# Patient Record
Sex: MALE | Race: WHITE | NOT HISPANIC OR LATINO | Employment: FULL TIME | URBAN - NONMETROPOLITAN AREA
[De-identification: names, ages, dates, MRNs, and addresses within clinical notes are randomized per-mention and may not be internally consistent; named-entity substitution may affect disease eponyms.]

---

## 2017-07-07 ENCOUNTER — TRANSCRIBE ORDERS (OUTPATIENT)
Dept: GENERAL RADIOLOGY | Facility: HOSPITAL | Age: 37
End: 2017-07-07

## 2017-07-07 ENCOUNTER — LAB (OUTPATIENT)
Dept: LAB | Facility: HOSPITAL | Age: 37
End: 2017-07-07

## 2017-07-07 DIAGNOSIS — Z00.00 ROUTINE GENERAL MEDICAL EXAMINATION AT A HEALTH CARE FACILITY: ICD-10-CM

## 2017-07-07 DIAGNOSIS — Z00.00 ROUTINE GENERAL MEDICAL EXAMINATION AT A HEALTH CARE FACILITY: Primary | ICD-10-CM

## 2017-07-07 LAB
ALBUMIN SERPL-MCNC: 4.3 G/DL (ref 3.5–5)
ALBUMIN/GLOB SERPL: 1.2 G/DL (ref 1.1–2.5)
ALP SERPL-CCNC: 77 U/L (ref 24–120)
ALT SERPL W P-5'-P-CCNC: 31 U/L (ref 0–54)
ANION GAP SERPL CALCULATED.3IONS-SCNC: 10 MMOL/L (ref 4–13)
AST SERPL-CCNC: 29 U/L (ref 7–45)
AUTO MIXED CELLS #: 0.3 10*3/MM3 (ref 0.1–2.6)
AUTO MIXED CELLS %: 3.6 % (ref 0.1–24)
BILIRUB SERPL-MCNC: 1.3 MG/DL (ref 0.1–1)
BILIRUB UR QL STRIP: NEGATIVE
BUN BLD-MCNC: 16 MG/DL (ref 5–21)
BUN/CREAT SERPL: 16.3
CALCIUM SPEC-SCNC: 9.6 MG/DL (ref 8.4–10.4)
CHLORIDE SERPL-SCNC: 100 MMOL/L (ref 98–110)
CHOLEST SERPL-MCNC: 171 MG/DL (ref 130–200)
CLARITY UR: CLEAR
CO2 SERPL-SCNC: 30 MMOL/L (ref 24–31)
COLOR UR: YELLOW
CREAT BLD-MCNC: 0.98 MG/DL (ref 0.5–1.4)
ERYTHROCYTE [DISTWIDTH] IN BLOOD BY AUTOMATED COUNT: 15.4 % (ref 12–15)
GFR SERPL CREATININE-BSD FRML MDRD: 86 ML/MIN/1.73
GLOBULIN UR ELPH-MCNC: 3.6 GM/DL
GLUCOSE BLD-MCNC: 90 MG/DL (ref 70–100)
GLUCOSE UR STRIP-MCNC: NEGATIVE MG/DL
HBA1C MFR BLD: 4.7 %
HCT VFR BLD AUTO: 48.4 % (ref 40–52)
HDLC SERPL-MCNC: 36 MG/DL
HGB BLD-MCNC: 18.3 G/DL (ref 14–18)
HGB UR QL STRIP.AUTO: NEGATIVE
KETONES UR QL STRIP: ABNORMAL
LDLC SERPL CALC-MCNC: 98 MG/DL (ref 0–99)
LDLC/HDLC SERPL: 2.73 {RATIO}
LEUKOCYTE ESTERASE UR QL STRIP.AUTO: NEGATIVE
LYMPHOCYTES # BLD AUTO: 2.8 10*3/MM3 (ref 0.8–7)
LYMPHOCYTES NFR BLD AUTO: 29.1 % (ref 15–45)
MCH RBC QN AUTO: 32.3 PG (ref 28–32)
MCHC RBC AUTO-ENTMCNC: 37.8 G/DL (ref 33–36)
MCV RBC AUTO: 85.4 FL (ref 82–95)
NEUTROPHILS # BLD AUTO: 6.6 10*3/MM3 (ref 1.5–8.3)
NEUTROPHILS NFR BLD AUTO: 67.3 % (ref 39–78)
NITRITE UR QL STRIP: NEGATIVE
PH UR STRIP.AUTO: 6 [PH] (ref 5–8)
PLATELET # BLD AUTO: 197 10*3/MM3 (ref 130–400)
PMV BLD AUTO: 9.8 FL (ref 6–12)
POTASSIUM BLD-SCNC: 4.9 MMOL/L (ref 3.5–5.3)
PROT SERPL-MCNC: 7.9 G/DL (ref 6.3–8.7)
PROT UR QL STRIP: NEGATIVE
RBC # BLD AUTO: 5.67 10*6/MM3 (ref 4.2–5.4)
SODIUM BLD-SCNC: 140 MMOL/L (ref 135–145)
SP GR UR STRIP: 1.02 (ref 1–1.03)
TRIGL SERPL-MCNC: 183 MG/DL (ref 0–149)
TSH SERPL DL<=0.05 MIU/L-ACNC: 1.95 MIU/ML (ref 0.47–4.68)
UROBILINOGEN UR QL STRIP: ABNORMAL
VLDLC SERPL-MCNC: 36.6 MG/DL
WBC NRBC COR # BLD: 9.7 10*3/MM3 (ref 4.8–10.8)

## 2017-07-07 PROCEDURE — 83036 HEMOGLOBIN GLYCOSYLATED A1C: CPT

## 2017-07-07 PROCEDURE — 80061 LIPID PANEL: CPT | Performed by: NURSE PRACTITIONER

## 2017-07-07 PROCEDURE — 80053 COMPREHEN METABOLIC PANEL: CPT | Performed by: NURSE PRACTITIONER

## 2017-07-07 PROCEDURE — 84443 ASSAY THYROID STIM HORMONE: CPT | Performed by: NURSE PRACTITIONER

## 2017-07-07 PROCEDURE — 36415 COLL VENOUS BLD VENIPUNCTURE: CPT

## 2017-07-07 PROCEDURE — 81003 URINALYSIS AUTO W/O SCOPE: CPT

## 2017-07-07 PROCEDURE — 85025 COMPLETE CBC W/AUTO DIFF WBC: CPT | Performed by: NURSE PRACTITIONER

## 2017-08-02 ENCOUNTER — OFFICE VISIT (OUTPATIENT)
Dept: RETAIL CLINIC | Facility: CLINIC | Age: 37
End: 2017-08-02

## 2017-08-02 VITALS
HEART RATE: 88 BPM | SYSTOLIC BLOOD PRESSURE: 117 MMHG | OXYGEN SATURATION: 98 % | RESPIRATION RATE: 18 BRPM | TEMPERATURE: 98.3 F | DIASTOLIC BLOOD PRESSURE: 86 MMHG | WEIGHT: 234 LBS

## 2017-08-02 DIAGNOSIS — R09.82 POST-NASAL DRAINAGE: ICD-10-CM

## 2017-08-02 DIAGNOSIS — J02.9 SORE THROAT: Primary | ICD-10-CM

## 2017-08-02 LAB
EXPIRATION DATE: NORMAL
INTERNAL CONTROL: NORMAL
Lab: NORMAL
S PYO AG THROAT QL: NEGATIVE

## 2017-08-02 PROCEDURE — 87880 STREP A ASSAY W/OPTIC: CPT | Performed by: NURSE PRACTITIONER

## 2017-08-02 PROCEDURE — 99213 OFFICE O/P EST LOW 20 MIN: CPT | Performed by: NURSE PRACTITIONER

## 2017-08-02 RX ORDER — AMOXICILLIN 500 MG/1
500 CAPSULE ORAL 2 TIMES DAILY
Qty: 20 CAPSULE | Refills: 0 | Status: SHIPPED | OUTPATIENT
Start: 2017-08-02 | End: 2017-08-12

## 2017-08-02 NOTE — PROGRESS NOTES
Subjective   Danny Ponce is a 37 y.o. male.     History of Present Illness   Danny Ponce presents today with complaints of sore throat, earache, and headache.  Symptoms first began Friday, and he felt fatigued and had chills.  Saturday he had a fever, but the fever has resolved.  The sore throat and headache have persisted; sore throat worse with swallowing.  He reports feeling like there is something in his throat that he can't clear, which he thinks is due to drainage.  His daughter recently had a cold.  No exposure to strep that he knows of.  He has been taking a multi symptom cold medicine with some relief of sore throat symptoms.    The following portions of the patient's history were reviewed and updated as appropriate: allergies, current medications, past medical history, past surgical history and problem list.    Review of Systems   Constitutional: Positive for appetite change, chills (resolved), fatigue and fever (resolved).   HENT: Positive for ear pain, postnasal drip and sore throat. Negative for congestion and rhinorrhea. Trouble swallowing: painful.    Respiratory: Negative.    Gastrointestinal: Positive for nausea. Negative for abdominal pain and vomiting.   Neurological: Positive for headaches.       Objective   Physical Exam   Constitutional: He is oriented to person, place, and time. He appears well-developed and well-nourished.   HENT:   Right Ear: External ear and ear canal normal. Tympanic membrane is erythematous (small area of redness along right edge).   Left Ear: Tympanic membrane, external ear and ear canal normal.   Nose: Nose normal.   Mouth/Throat: Uvula is midline and mucous membranes are normal. Posterior oropharyngeal erythema present. Tonsils are 2+ on the right. Tonsils are 2+ on the left. No tonsillar exudate.   Thick mucoid drainage in oropharynx   Cardiovascular: Regular rhythm and normal heart sounds.    Pulmonary/Chest: Effort normal and breath sounds normal.    Lymphadenopathy:        Head (right side): No submandibular and no tonsillar adenopathy present.        Head (left side): No submandibular and no tonsillar adenopathy present.     He has no cervical adenopathy.   Neurological: He is alert and oriented to person, place, and time.   Skin: Skin is warm and dry.   Vitals reviewed.      Assessment/Plan   Danny was seen today for sore throat.    Diagnoses and all orders for this visit:    Sore throat  -     POC Rapid Strep A    Post-nasal drainage    Other orders  -     amoxicillin (AMOXIL) 500 MG capsule; Take 1 capsule by mouth 2 (Two) Times a Day for 10 days.        Rapid strep negative.  Sore throat symptoms likely due to PND.  Patient advised to try an antihistamine.  He has tried using a neti pot, but he states it wasn't effective.  If symptoms have not improved in 48 hours or start worsening, start antibiotic.

## 2018-02-07 PROCEDURE — 85025 COMPLETE CBC W/AUTO DIFF WBC: CPT | Performed by: FAMILY MEDICINE

## 2018-02-07 PROCEDURE — 85007 BL SMEAR W/DIFF WBC COUNT: CPT | Performed by: FAMILY MEDICINE

## 2018-02-16 ENCOUNTER — APPOINTMENT (OUTPATIENT)
Dept: LAB | Facility: HOSPITAL | Age: 38
End: 2018-02-16

## 2018-02-16 ENCOUNTER — OFFICE VISIT (OUTPATIENT)
Dept: GASTROENTEROLOGY | Facility: CLINIC | Age: 38
End: 2018-02-16

## 2018-02-16 VITALS
DIASTOLIC BLOOD PRESSURE: 80 MMHG | SYSTOLIC BLOOD PRESSURE: 120 MMHG | OXYGEN SATURATION: 99 % | TEMPERATURE: 95.3 F | BODY MASS INDEX: 33.88 KG/M2 | HEIGHT: 71 IN | WEIGHT: 242 LBS | HEART RATE: 58 BPM

## 2018-02-16 DIAGNOSIS — R10.32 LEFT LOWER QUADRANT PAIN: Primary | ICD-10-CM

## 2018-02-16 DIAGNOSIS — R19.4 CHANGE IN BOWEL HABITS: ICD-10-CM

## 2018-02-16 LAB
ANION GAP SERPL CALCULATED.3IONS-SCNC: 12 MMOL/L (ref 4–13)
BASOPHILS # BLD AUTO: 0.04 10*3/MM3 (ref 0–0.2)
BASOPHILS NFR BLD AUTO: 0.6 % (ref 0–2)
BUN BLD-MCNC: 14 MG/DL (ref 5–21)
BUN/CREAT SERPL: 15.6 (ref 7–25)
CALCIUM SPEC-SCNC: 9.7 MG/DL (ref 8.4–10.4)
CHLORIDE SERPL-SCNC: 103 MMOL/L (ref 98–110)
CO2 SERPL-SCNC: 29 MMOL/L (ref 24–31)
CREAT BLD-MCNC: 0.9 MG/DL (ref 0.5–1.4)
DEPRECATED RDW RBC AUTO: 38.1 FL (ref 40–54)
EOSINOPHIL # BLD AUTO: 0.07 10*3/MM3 (ref 0–0.7)
EOSINOPHIL NFR BLD AUTO: 1 % (ref 0–4)
ERYTHROCYTE [DISTWIDTH] IN BLOOD BY AUTOMATED COUNT: 12.7 % (ref 12–15)
GFR SERPL CREATININE-BSD FRML MDRD: 95 ML/MIN/1.73
GLUCOSE BLD-MCNC: 85 MG/DL (ref 70–100)
HCT VFR BLD AUTO: 47.8 % (ref 40–52)
HGB BLD-MCNC: 17.6 G/DL (ref 14–18)
IMM GRANULOCYTES # BLD: 0.01 10*3/MM3 (ref 0–0.03)
IMM GRANULOCYTES NFR BLD: 0.1 % (ref 0–5)
LYMPHOCYTES # BLD AUTO: 2.47 10*3/MM3 (ref 0.72–4.86)
LYMPHOCYTES NFR BLD AUTO: 36.1 % (ref 15–45)
MCH RBC QN AUTO: 30.7 PG (ref 28–32)
MCHC RBC AUTO-ENTMCNC: 36.8 G/DL (ref 33–36)
MCV RBC AUTO: 83.3 FL (ref 82–95)
MONOCYTES # BLD AUTO: 0.46 10*3/MM3 (ref 0.19–1.3)
MONOCYTES NFR BLD AUTO: 6.7 % (ref 4–12)
NEUTROPHILS # BLD AUTO: 3.79 10*3/MM3 (ref 1.87–8.4)
NEUTROPHILS NFR BLD AUTO: 55.5 % (ref 39–78)
NRBC BLD MANUAL-RTO: 0 /100 WBC (ref 0–0)
PLATELET # BLD AUTO: 218 10*3/MM3 (ref 130–400)
PMV BLD AUTO: 10.6 FL (ref 6–12)
POTASSIUM BLD-SCNC: 4.9 MMOL/L (ref 3.5–5.3)
RBC # BLD AUTO: 5.74 10*6/MM3 (ref 4.8–5.9)
SODIUM BLD-SCNC: 144 MMOL/L (ref 135–145)
WBC NRBC COR # BLD: 6.84 10*3/MM3 (ref 4.8–10.8)

## 2018-02-16 PROCEDURE — 80048 BASIC METABOLIC PNL TOTAL CA: CPT | Performed by: NURSE PRACTITIONER

## 2018-02-16 PROCEDURE — 99214 OFFICE O/P EST MOD 30 MIN: CPT | Performed by: NURSE PRACTITIONER

## 2018-02-16 PROCEDURE — 36415 COLL VENOUS BLD VENIPUNCTURE: CPT | Performed by: NURSE PRACTITIONER

## 2018-02-16 PROCEDURE — 85025 COMPLETE CBC W/AUTO DIFF WBC: CPT | Performed by: NURSE PRACTITIONER

## 2018-02-16 NOTE — PROGRESS NOTES
"[facility] GASTROENTEROLOGY - OFFICE NOTE    2/16/2018    Danny Ponce   1980    Primary Physician: Enzo Lim MD    Chief Complaint   Patient presents with   • Abdominal Pain     LOWER LEFT         HISTORY OF PRESENT ILLNESS    Danny Ponce is a 37 y.o. male presents  with intermittent llq pain, described as a cramp. Started summer 2017, initially saw dr lim and was thought to be constipation, was recommended laxative, took laxative and symptoms resolved.  Recurrence of pain 3-4  Months ago.  Also has noted change in stool caliber , flat stools and occasional loose stools. No rectal bleeding. Has had some nausea , no vomiting. No fever or chills. No weight loss.  No back pain.  No problems urinating.  No triggers. No alleviating factors.  No imaging studies.  He saw dr kaur recently and says \" did not find a hernia\".  No h/o colonoscopy or egd. No family h/o colon cancer or polyps.  No family h/o ibd.           History reviewed. No pertinent past medical history.    Past Surgical History:   Procedure Laterality Date   • NO PAST SURGERIES         No outpatient prescriptions have been marked as taking for the 2/16/18 encounter (Office Visit) with JOSSY Modi.       No Known Allergies    Social History     Social History   • Marital status:      Spouse name: N/A   • Number of children: N/A   • Years of education: N/A     Occupational History   • Not on file.     Social History Main Topics   • Smoking status: Never Smoker   • Smokeless tobacco: Never Used   • Alcohol use Yes      Comment: occ   • Drug use: No   • Sexual activity: Defer     Other Topics Concern   • Not on file     Social History Narrative       Family History   Problem Relation Age of Onset   • Diabetes Mother    • Stomach cancer Maternal Grandfather    • Colon cancer Neg Hx    • Colon polyps Neg Hx        Review of Systems   Constitutional: Negative for appetite change, chills, fatigue, fever and unexpected weight " "change.   HENT: Negative for sore throat and trouble swallowing.    Respiratory: Negative for cough, chest tightness, shortness of breath and wheezing.    Cardiovascular: Negative for chest pain and palpitations.   Gastrointestinal: Positive for abdominal pain, diarrhea and nausea. Negative for abdominal distention, anal bleeding, blood in stool, constipation, rectal pain and vomiting.        As mentioned in hpi   Genitourinary: Negative for difficulty urinating, dysuria, frequency and hematuria.   Musculoskeletal: Negative for arthralgias, back pain and myalgias.   Skin: Negative for color change and rash.   Neurological: Positive for headaches. Negative for dizziness, seizures, syncope and light-headedness.   Hematological: Negative for adenopathy.   Psychiatric/Behavioral: Negative for confusion. The patient is not nervous/anxious.         Vitals:    02/16/18 1001   BP: 120/80   BP Location: Left arm   Patient Position: Sitting   Cuff Size: Adult   Pulse: 58   Temp: 95.3 °F (35.2 °C)   SpO2: 99%   Weight: 110 kg (242 lb)   Height: 180.3 cm (71\")      Body mass index is 33.75 kg/(m^2).    Physical Exam   Constitutional: He appears well-developed and well-nourished. No distress.   HENT:   Head: Normocephalic and atraumatic.   Eyes: EOM are normal. No scleral icterus.   Neck: Neck supple. No JVD present.   Cardiovascular: Normal rate, regular rhythm and normal heart sounds.    Pulmonary/Chest: Effort normal and breath sounds normal. No stridor.   Abdominal: Soft. Bowel sounds are normal. He exhibits no distension and no mass. There is no tenderness. There is no rebound and no guarding.   Musculoskeletal: He exhibits no edema.   Neurological: He is alert.   Skin: Skin is warm and dry. No rash noted.   Psychiatric: He has a normal mood and affect. His behavior is normal.   Vitals reviewed.      Results for orders placed or performed during the hospital encounter of 02/07/18   CBC Auto Differential   Result Value Ref " Range    WBC 6.99 4.80 - 10.80 10*3/mm3    RBC 5.76 4.80 - 5.90 10*6/mm3    Hemoglobin 17.6 14.0 - 18.0 g/dL    Hematocrit 47.8 40.0 - 52.0 %    MCV 83.0 82.0 - 95.0 fL    MCH 30.6 28.0 - 32.0 pg    MCHC 36.8 (H) 33.0 - 36.0 g/dL    RDW 12.9 12.0 - 15.0 %    RDW-SD 38.5 (L) 40.0 - 54.0 fl    MPV 10.2 6.0 - 12.0 fL    Platelets 199 130 - 400 10*3/mm3    Neutrophil % 50.9 39.0 - 78.0 %    Lymphocyte % 40.6 15.0 - 45.0 %    Monocyte % 6.0 4.0 - 12.0 %    Eosinophil % 2.0 0.0 - 4.0 %    Basophil % 0.4 0.0 - 2.0 %    Immature Grans % 0.1 0.0 - 5.0 %    Neutrophils, Absolute 3.55 1.87 - 8.40 10*3/mm3    Lymphocytes, Absolute 2.84 0.72 - 4.86 10*3/mm3    Monocytes, Absolute 0.42 0.19 - 1.30 10*3/mm3    Eosinophils, Absolute 0.14 0.00 - 0.70 10*3/mm3    Basophils, Absolute 0.03 0.00 - 0.20 10*3/mm3    Immature Grans, Absolute 0.01 0.00 - 0.03 10*3/mm3    nRBC 0.0 0.0 - 0.0 /100 WBC   POCT Urinalysis dipstick, automated   Result Value Ref Range    Color Yellow Yellow, Straw, Dark Yellow, Crystal    Clarity, UA Clear Clear    Glucose, UA Negative Negative, 1000 mg/dL (3+) mg/dL    Bilirubin Negative Negative    Ketones, UA Negative Negative    Specific Gravity  1.025 1.005 - 1.030    Blood, UA Negative Negative    pH, Urine 5.5 5.0 - 8.0    Protein, POC Negative Negative mg/dL    Urobilinogen, UA Normal Normal    Leukocytes Negative Negative    Nitrite, UA Negative Negative           ASSESSMENT AND PLAN    Danny was seen today for abdominal pain.    Diagnoses and all orders for this visit:    Left lower quadrant pain  -     CT Abdomen Pelvis With Contrast  -     CBC & Differential  -     Basic Metabolic Panel    Change in bowel habits      Plan for ct abd/pelvis and if negative then proceed with colonoscopy.  Er if worsening symptoms.         Body mass index is 33.75 kg/(m^2).       JOSSY Cabrera Dragon/transcription disclaimer:  Much of this encounter note is electronic transcription/translation of spoken  language to printed text.  The electronic translation of spoken language may be erroneous, or at times, nonsensical words or phrases may be inadvertently transcribed.  Although I have reviewed the note for such errors, some may still exist.    I called the patient today, he still continues to have llq pain but has improved some, not resolved.  He was actually tender on exam llq at his office visit that was not included in my exam.  He was  tender on exam of left lower quadrant  when seen in the urgent care on 2/7/18 as well.  Ct has been ordered and I will speak with his insurance company tomorrow for a peer to peer.

## 2018-02-17 ENCOUNTER — TELEPHONE (OUTPATIENT)
Dept: GASTROENTEROLOGY | Facility: CLINIC | Age: 38
End: 2018-02-17

## 2018-03-02 ENCOUNTER — APPOINTMENT (OUTPATIENT)
Dept: CT IMAGING | Facility: HOSPITAL | Age: 38
End: 2018-03-02

## 2018-03-09 ENCOUNTER — TELEPHONE (OUTPATIENT)
Dept: GASTROENTEROLOGY | Facility: CLINIC | Age: 38
End: 2018-03-09

## 2018-03-09 ENCOUNTER — PREP FOR SURGERY (OUTPATIENT)
Dept: OTHER | Facility: HOSPITAL | Age: 38
End: 2018-03-09

## 2018-03-09 ENCOUNTER — HOSPITAL ENCOUNTER (OUTPATIENT)
Dept: CT IMAGING | Facility: HOSPITAL | Age: 38
Discharge: HOME | End: 2018-03-09
Admitting: NURSE PRACTITIONER

## 2018-03-09 DIAGNOSIS — R10.32 LEFT LOWER QUADRANT PAIN: Primary | ICD-10-CM

## 2018-03-09 LAB — CREAT BLDA-MCNC: 1 MG/DL (ref 0.6–1.3)

## 2018-03-09 PROCEDURE — 0 IOHEXOL 300 MG/ML SOLUTION: Performed by: NURSE PRACTITIONER

## 2018-03-09 PROCEDURE — 0 IOPAMIDOL 61 % SOLUTION: Performed by: NURSE PRACTITIONER

## 2018-03-09 PROCEDURE — 82565 ASSAY OF CREATININE: CPT

## 2018-03-09 PROCEDURE — 74177 CT ABD & PELVIS W/CONTRAST: CPT

## 2018-03-09 RX ORDER — POLYETHYLENE GLYCOL 3350, SODIUM CHLORIDE, SODIUM BICARBONATE, POTASSIUM CHLORIDE 420; 11.2; 5.72; 1.48 G/4L; G/4L; G/4L; G/4L
4000 POWDER, FOR SOLUTION ORAL SEE ADMIN INSTRUCTIONS
Qty: 4000 ML | Refills: 0 | Status: SHIPPED | OUTPATIENT
Start: 2018-03-09 | End: 2018-03-23 | Stop reason: HOSPADM

## 2018-03-09 RX ADMIN — IOPAMIDOL 100 ML: 612 INJECTION, SOLUTION INTRAVENOUS at 09:45

## 2018-03-09 RX ADMIN — IOHEXOL 50 ML: 300 INJECTION, SOLUTION INTRAVENOUS at 09:45

## 2018-03-09 NOTE — TELEPHONE ENCOUNTER
Please let him know I tried to call him with no answer, let him know that the CT scan did not show diverticulitis, it did show a small area on his liver that most likely is a cyst which would not require any further workup.  Let him know I did call the radiologist and he said an ultrasound might give us more information.  So if he is agreeable let me know and I will schedule ultrasound of the liver as well.  I will put the case request in for the colonoscopy as well since the colonoscopy did not show diverticulitis.

## 2018-03-12 DIAGNOSIS — R93.5 ABNORMAL CT OF THE ABDOMEN: Primary | ICD-10-CM

## 2018-03-12 NOTE — TELEPHONE ENCOUNTER
I put order in for ultrasound liver, also make sure susan aware I put order in for colonoscopy. Thanks

## 2018-03-13 NOTE — TELEPHONE ENCOUNTER
PT is scheduled 4-13-18 for colonoscopy.  PT requested further out due to wife pregnant and due anytime.  US sent to scheduling.

## 2018-03-14 PROBLEM — R10.32 LEFT LOWER QUADRANT PAIN: Status: ACTIVE | Noted: 2018-03-14

## 2018-03-15 ENCOUNTER — HOSPITAL ENCOUNTER (OUTPATIENT)
Dept: ULTRASOUND IMAGING | Facility: HOSPITAL | Age: 38
Discharge: HOME | End: 2018-03-15
Admitting: NURSE PRACTITIONER

## 2018-03-15 DIAGNOSIS — R93.5 ABNORMAL CT OF THE ABDOMEN: ICD-10-CM

## 2018-03-15 PROCEDURE — 76705 ECHO EXAM OF ABDOMEN: CPT

## 2018-03-18 ENCOUNTER — TELEPHONE (OUTPATIENT)
Dept: GASTROENTEROLOGY | Facility: CLINIC | Age: 38
End: 2018-03-18

## 2018-03-18 NOTE — TELEPHONE ENCOUNTER
Let patient know ultrasound of the liver shows a simple cyst which would require no further workup

## 2018-03-23 ENCOUNTER — HOSPITAL ENCOUNTER (OUTPATIENT)
Facility: HOSPITAL | Age: 38
Setting detail: HOSPITAL OUTPATIENT SURGERY
Discharge: HOME | End: 2018-03-23
Attending: INTERNAL MEDICINE | Admitting: INTERNAL MEDICINE

## 2018-03-23 ENCOUNTER — ANESTHESIA EVENT (OUTPATIENT)
Dept: GASTROENTEROLOGY | Facility: HOSPITAL | Age: 38
End: 2018-03-23

## 2018-03-23 ENCOUNTER — ANESTHESIA (OUTPATIENT)
Dept: GASTROENTEROLOGY | Facility: HOSPITAL | Age: 38
End: 2018-03-23

## 2018-03-23 VITALS
BODY MASS INDEX: 32.48 KG/M2 | RESPIRATION RATE: 14 BRPM | TEMPERATURE: 97.8 F | HEART RATE: 92 BPM | OXYGEN SATURATION: 96 % | DIASTOLIC BLOOD PRESSURE: 78 MMHG | SYSTOLIC BLOOD PRESSURE: 119 MMHG | WEIGHT: 232 LBS | HEIGHT: 71 IN

## 2018-03-23 PROCEDURE — 45378 DIAGNOSTIC COLONOSCOPY: CPT | Performed by: INTERNAL MEDICINE

## 2018-03-23 PROCEDURE — 25010000002 PROPOFOL 10 MG/ML EMULSION: Performed by: NURSE ANESTHETIST, CERTIFIED REGISTERED

## 2018-03-23 RX ORDER — ONDANSETRON 2 MG/ML
4 INJECTION INTRAMUSCULAR; INTRAVENOUS ONCE AS NEEDED
Status: DISCONTINUED | OUTPATIENT
Start: 2018-03-23 | End: 2018-03-23 | Stop reason: HOSPADM

## 2018-03-23 RX ORDER — SODIUM CHLORIDE 0.9 % (FLUSH) 0.9 %
3 SYRINGE (ML) INJECTION AS NEEDED
Status: DISCONTINUED | OUTPATIENT
Start: 2018-03-23 | End: 2018-03-23 | Stop reason: HOSPADM

## 2018-03-23 RX ORDER — LIDOCAINE HYDROCHLORIDE 20 MG/ML
INJECTION, SOLUTION INFILTRATION; PERINEURAL AS NEEDED
Status: DISCONTINUED | OUTPATIENT
Start: 2018-03-23 | End: 2018-03-23 | Stop reason: SURG

## 2018-03-23 RX ORDER — SODIUM CHLORIDE 9 MG/ML
500 INJECTION, SOLUTION INTRAVENOUS CONTINUOUS PRN
Status: DISCONTINUED | OUTPATIENT
Start: 2018-03-23 | End: 2018-03-23 | Stop reason: HOSPADM

## 2018-03-23 RX ORDER — PROPOFOL 10 MG/ML
VIAL (ML) INTRAVENOUS AS NEEDED
Status: DISCONTINUED | OUTPATIENT
Start: 2018-03-23 | End: 2018-03-23 | Stop reason: SURG

## 2018-03-23 RX ADMIN — PROPOFOL 50 MG: 10 INJECTION, EMULSION INTRAVENOUS at 13:23

## 2018-03-23 RX ADMIN — LIDOCAINE HYDROCHLORIDE 0.5 ML: 10 INJECTION, SOLUTION EPIDURAL; INFILTRATION; INTRACAUDAL; PERINEURAL at 10:42

## 2018-03-23 RX ADMIN — LIDOCAINE HYDROCHLORIDE 50 MG: 20 INJECTION, SOLUTION INFILTRATION; PERINEURAL at 13:17

## 2018-03-23 RX ADMIN — PROPOFOL 50 MG: 10 INJECTION, EMULSION INTRAVENOUS at 13:20

## 2018-03-23 RX ADMIN — PROPOFOL 50 MG: 10 INJECTION, EMULSION INTRAVENOUS at 13:22

## 2018-03-23 RX ADMIN — PROPOFOL 20 MG: 10 INJECTION, EMULSION INTRAVENOUS at 13:24

## 2018-03-23 RX ADMIN — PROPOFOL 20 MG: 10 INJECTION, EMULSION INTRAVENOUS at 13:25

## 2018-03-23 RX ADMIN — PROPOFOL 50 MG: 10 INJECTION, EMULSION INTRAVENOUS at 13:17

## 2018-03-23 RX ADMIN — PROPOFOL 20 MG: 10 INJECTION, EMULSION INTRAVENOUS at 13:26

## 2018-03-23 RX ADMIN — SODIUM CHLORIDE 500 ML: 9 INJECTION, SOLUTION INTRAVENOUS at 10:41

## 2018-03-23 NOTE — H&P
"[facility] Gastroenterology  Pre Procedure History & Physical    Chief Complaint:   Change in bowel habits    Subjective     HPI:   He was having some left sided discomfort is left lower quadrant.  There is associated with some constipation and pencil thin stools.  He states he is improved.  Abdominal discomfort is improved.  He is no longer having the discomfort.  He still has some loose stools.    Past Medical History:   History reviewed. No pertinent past medical history.    Past Surgical History:  Past Surgical History:   Procedure Laterality Date   • NO PAST SURGERIES          Family History:  Family History   Problem Relation Age of Onset   • Diabetes Mother    • Stomach cancer Maternal Grandfather    • Colon cancer Neg Hx    • Colon polyps Neg Hx        Social History:   reports that he has never smoked. He has never used smokeless tobacco. He reports that he drinks alcohol. He reports that he does not use drugs.    Medications:   Prior to Admission medications    Medication Sig Start Date End Date Taking? Authorizing Provider   polyethylene glycol-electrolytes (NULYTELY WITH FLAVOR PACKS) 420 g solution Take 4,000 mL by mouth See Admin Instructions. 3/9/18  Yes JOSSY Modi       Allergies:  Review of patient's allergies indicates no known allergies.    ROS:    General: Weight stable  Resp: No SOA  Cardiovascular: No CP    Objective     Pulse 100, temperature 97.8 °F (36.6 °C), temperature source Temporal Artery , resp. rate 20, height 180.3 cm (71\"), weight 105 kg (232 lb), SpO2 100 %.    Physical Exam   Constitutional: Pt is oriented to person, place, and in no distress.   HENT: Mouth/Throat: Oropharynx is clear.   Cardiovascular: Normal rate, regular rhythm.    Pulmonary/Chest: Effort normal. No respiratory distress. No  wheezes.   Abdominal: Soft. Non-distended.  Skin: Skin is warm and dry.   Psychiatric: Mood, memory, affect and judgment appear normal.     Assessment/Plan "     Diagnosis:  Change in bowel habits with left lower quadrant discomfort    Anticipated Surgical Procedure:  Colonoscopy    The risks, benefits, and alternatives of this procedure have been discussed with the patient or the responsible party- the patient understands and agrees to proceed.    EMR Dragon/transcription disclaimer:  Much of this encounter note is electronic transcription/translation of spoken language to printed text.  The electronic translation of spoken language may be erroneous, or at times, nonsensical words or phrases may be inadvertently transcribed.  Although I have reviewed the note for such errors, some may still exist.

## 2018-03-23 NOTE — ANESTHESIA PREPROCEDURE EVALUATION
Anesthesia Evaluation     Patient summary reviewed   no history of anesthetic complications:  NPO Solid Status: > 8 hours  NPO Liquid Status: > 4 hours           Airway   Mallampati: I  TM distance: >3 FB  Neck ROM: full  No difficulty expected  Dental - normal exam     Pulmonary    (-) asthma, sleep apnea, not a smoker  Cardiovascular - negative cardio ROS  Exercise tolerance: good (4-7 METS)    (-) hypertension, past MI, CAD      Neuro/Psych  (-) seizures, TIA, CVA  GI/Hepatic/Renal/Endo - negative ROS   (-) liver disease, no renal disease, diabetes    Musculoskeletal (-) negative ROS    Abdominal    Substance History      OB/GYN          Other                        Anesthesia Plan    ASA 1     general   total IV anesthesia  intravenous induction   Anesthetic plan and risks discussed with patient.

## 2018-06-19 ENCOUNTER — CLINICAL SUPPORT (OUTPATIENT)
Dept: RETAIL CLINIC | Facility: CLINIC | Age: 38
End: 2018-06-19

## 2018-06-19 DIAGNOSIS — Z23 NEED FOR VACCINATION: Primary | ICD-10-CM

## 2018-06-19 NOTE — PROGRESS NOTES
Patient presents to clinic requesting Tetanus shot.  He was outside and stepped on a landscaping staple and has a puncture wound to his toe. His is currently wearing shoes and socks and ambulating without difficulty.  He does not want to be seen today for the wound as he feels he can manage it himself. (I encouraged him to keep the wound clean and dry and to monitor for s/s of infection).  I have explained that we do not have plain Tetanus toxoid available. I have offered him Tdap and he has reviewed the VIS.  He would like to precede with the Tdap vaccination.  See administration note.

## 2018-08-23 ENCOUNTER — TRANSCRIBE ORDERS (OUTPATIENT)
Dept: ADMINISTRATIVE | Facility: HOSPITAL | Age: 38
End: 2018-08-23

## 2018-08-23 DIAGNOSIS — Z13.6 ENCOUNTER FOR SCREENING FOR VASCULAR DISEASE: Primary | ICD-10-CM

## 2018-08-28 ENCOUNTER — HOSPITAL ENCOUNTER (OUTPATIENT)
Dept: ULTRASOUND IMAGING | Facility: HOSPITAL | Age: 38
Discharge: HOME | End: 2018-08-28
Attending: PEDIATRICS | Admitting: PEDIATRICS

## 2018-08-28 DIAGNOSIS — Z13.6 ENCOUNTER FOR SCREENING FOR VASCULAR DISEASE: ICD-10-CM

## 2018-08-28 PROCEDURE — 93799 UNLISTED CV SVC/PROCEDURE: CPT

## 2018-10-02 ENCOUNTER — TRANSCRIBE ORDERS (OUTPATIENT)
Dept: ADMINISTRATIVE | Facility: HOSPITAL | Age: 38
End: 2018-10-02

## 2018-10-02 ENCOUNTER — LAB (OUTPATIENT)
Dept: LAB | Facility: HOSPITAL | Age: 38
End: 2018-10-02

## 2018-10-02 ENCOUNTER — HOSPITAL ENCOUNTER (OUTPATIENT)
Dept: ULTRASOUND IMAGING | Facility: HOSPITAL | Age: 38
Discharge: HOME | End: 2018-10-02
Admitting: NURSE PRACTITIONER

## 2018-10-02 DIAGNOSIS — R09.89 OTHER SPECIFIED SYMPTOMS AND SIGNS INVOLVING THE CIRCULATORY AND RESPIRATORY SYSTEMS: ICD-10-CM

## 2018-10-02 DIAGNOSIS — R09.89 OTHER SPECIFIED SYMPTOMS AND SIGNS INVOLVING THE CIRCULATORY AND RESPIRATORY SYSTEMS: Primary | ICD-10-CM

## 2018-10-02 LAB
ALBUMIN SERPL-MCNC: 4.2 G/DL (ref 3.5–5)
ALBUMIN/GLOB SERPL: 1.4 G/DL (ref 1.1–2.5)
ALP SERPL-CCNC: 77 U/L (ref 24–120)
ALT SERPL W P-5'-P-CCNC: 50 U/L (ref 0–54)
ANION GAP SERPL CALCULATED.3IONS-SCNC: 9 MMOL/L (ref 4–13)
AST SERPL-CCNC: 30 U/L (ref 7–45)
AUTO MIXED CELLS #: 0.5 10*3/MM3 (ref 0.1–2.6)
AUTO MIXED CELLS %: 5.5 % (ref 0.1–24)
BILIRUB SERPL-MCNC: 0.8 MG/DL (ref 0.1–1)
BUN BLD-MCNC: 15 MG/DL (ref 5–21)
BUN/CREAT SERPL: 18.3
CALCIUM SPEC-SCNC: 8.7 MG/DL (ref 8.4–10.4)
CHLORIDE SERPL-SCNC: 106 MMOL/L (ref 98–110)
CO2 SERPL-SCNC: 27 MMOL/L (ref 24–31)
CREAT BLD-MCNC: 0.82 MG/DL (ref 0.5–1.4)
ERYTHROCYTE [DISTWIDTH] IN BLOOD BY AUTOMATED COUNT: 15.5 % (ref 12–15)
GFR SERPL CREATININE-BSD FRML MDRD: 105 ML/MIN/1.73
GLOBULIN UR ELPH-MCNC: 3 GM/DL
GLUCOSE BLD-MCNC: 96 MG/DL (ref 70–100)
HCT VFR BLD AUTO: 45.7 % (ref 40–52)
HGB BLD-MCNC: 16.7 G/DL (ref 14–18)
LYMPHOCYTES # BLD AUTO: 2.7 10*3/MM3 (ref 0.8–7)
LYMPHOCYTES NFR BLD AUTO: 31.1 % (ref 15–45)
MCH RBC QN AUTO: 31.3 PG (ref 28–32)
MCHC RBC AUTO-ENTMCNC: 36.5 G/DL (ref 33–36)
MCV RBC AUTO: 85.7 FL (ref 82–95)
NEUTROPHILS # BLD AUTO: 5.4 10*3/MM3 (ref 1.5–8.3)
NEUTROPHILS NFR BLD AUTO: 63.4 % (ref 39–78)
PLATELET # BLD AUTO: 205 10*3/MM3 (ref 130–400)
PMV BLD AUTO: 9.5 FL (ref 6–12)
POTASSIUM BLD-SCNC: 4.4 MMOL/L (ref 3.5–5.3)
PROT SERPL-MCNC: 7.2 G/DL (ref 6.3–8.7)
RBC # BLD AUTO: 5.33 10*6/MM3 (ref 4.2–5.4)
SODIUM BLD-SCNC: 142 MMOL/L (ref 135–145)
WBC NRBC COR # BLD: 8.6 10*3/MM3 (ref 4.8–10.8)

## 2018-10-02 PROCEDURE — 76536 US EXAM OF HEAD AND NECK: CPT

## 2018-10-02 PROCEDURE — 86800 THYROGLOBULIN ANTIBODY: CPT | Performed by: NURSE PRACTITIONER

## 2018-10-02 PROCEDURE — 86376 MICROSOMAL ANTIBODY EACH: CPT | Performed by: NURSE PRACTITIONER

## 2018-10-02 PROCEDURE — 36415 COLL VENOUS BLD VENIPUNCTURE: CPT

## 2018-10-02 PROCEDURE — 84481 FREE ASSAY (FT-3): CPT | Performed by: NURSE PRACTITIONER

## 2018-10-02 PROCEDURE — 84439 ASSAY OF FREE THYROXINE: CPT | Performed by: NURSE PRACTITIONER

## 2018-10-02 PROCEDURE — 84443 ASSAY THYROID STIM HORMONE: CPT | Performed by: NURSE PRACTITIONER

## 2018-10-02 PROCEDURE — 80053 COMPREHEN METABOLIC PANEL: CPT

## 2018-10-02 PROCEDURE — 85025 COMPLETE CBC W/AUTO DIFF WBC: CPT

## 2018-10-03 LAB
T4 FREE SERPL-MCNC: 1.75 NG/DL (ref 0.78–2.19)
TSH SERPL DL<=0.05 MIU/L-ACNC: 1.6 MIU/ML (ref 0.47–4.68)

## 2018-10-04 LAB
T3FREE SERPL-MCNC: 3.2 PG/ML (ref 2–4.4)
THYROGLOB AB SERPL-ACNC: <1 IU/ML (ref 0–0.9)
THYROPEROXIDASE AB SERPL-ACNC: 12 IU/ML (ref 0–34)

## 2018-12-27 ENCOUNTER — OFFICE VISIT (OUTPATIENT)
Dept: RETAIL CLINIC | Facility: CLINIC | Age: 38
End: 2018-12-27

## 2018-12-27 VITALS
DIASTOLIC BLOOD PRESSURE: 83 MMHG | BODY MASS INDEX: 33.46 KG/M2 | SYSTOLIC BLOOD PRESSURE: 112 MMHG | HEIGHT: 71 IN | TEMPERATURE: 98.1 F | HEART RATE: 99 BPM | OXYGEN SATURATION: 98 % | WEIGHT: 239 LBS | RESPIRATION RATE: 16 BRPM

## 2018-12-27 DIAGNOSIS — H69.82 EUSTACHIAN TUBE DYSFUNCTION, LEFT: ICD-10-CM

## 2018-12-27 DIAGNOSIS — J01.00 ACUTE NON-RECURRENT MAXILLARY SINUSITIS: Primary | ICD-10-CM

## 2018-12-27 PROCEDURE — 99213 OFFICE O/P EST LOW 20 MIN: CPT | Performed by: NURSE PRACTITIONER

## 2018-12-27 PROCEDURE — 96372 THER/PROPH/DIAG INJ SC/IM: CPT | Performed by: NURSE PRACTITIONER

## 2018-12-27 RX ORDER — DEXAMETHASONE SODIUM PHOSPHATE 4 MG/ML
8 INJECTION, SOLUTION INTRA-ARTICULAR; INTRALESIONAL; INTRAMUSCULAR; INTRAVENOUS; SOFT TISSUE ONCE
Status: COMPLETED | OUTPATIENT
Start: 2018-12-27 | End: 2018-12-27

## 2018-12-27 RX ORDER — AMOXICILLIN AND CLAVULANATE POTASSIUM 875; 125 MG/1; MG/1
1 TABLET, FILM COATED ORAL 2 TIMES DAILY
Qty: 20 TABLET | Refills: 0 | Status: SHIPPED | OUTPATIENT
Start: 2018-12-27 | End: 2019-01-06

## 2018-12-27 RX ORDER — FLUTICASONE PROPIONATE 50 MCG
2 SPRAY, SUSPENSION (ML) NASAL DAILY
Qty: 1 BOTTLE | Refills: 0 | Status: SHIPPED | OUTPATIENT
Start: 2018-12-27 | End: 2019-01-10

## 2018-12-27 RX ADMIN — DEXAMETHASONE SODIUM PHOSPHATE 8 MG: 4 INJECTION, SOLUTION INTRA-ARTICULAR; INTRALESIONAL; INTRAMUSCULAR; INTRAVENOUS; SOFT TISSUE at 17:35

## 2018-12-27 NOTE — PROGRESS NOTES
"  Chief Complaint   Patient presents with   • Sinus Problem     Subjective   Danny Ponce is a 38 y.o. male who presents to the clinic today with complaints complaints of sinus congestion, sinus pain, left ear pain.  He reports, \"I'm just ready for this to go away.\"  A coworker suggested he get a shot.  Sinus Problem   This is a new problem. Episode onset: about three weeks ago. The problem has been gradually worsening (initally started as cold symptoms which began to improve, then sinus symptoms worsened) since onset. There has been no fever. Associated symptoms include congestion, ear pain (left, feels full, has popped some), headaches (frontal) and sinus pressure. Pertinent negatives include no chills, coughing (worse left maxillary, left frontal around eye), shortness of breath, sneezing or sore throat. Past treatments include acetaminophen (netipot, chiropractic \"sinus adjustment\"). The treatment provided mild relief.     Current Outpatient Medications:   None reported    Allergies:  Patient has no known allergies.    History reviewed. No pertinent past medical history.  Past Surgical History:   Procedure Laterality Date   • COLONOSCOPY N/A 3/23/2018    Procedure: COLONOSCOPY WITH ANESTHESIA;  Surgeon: Tomas Mendez MD;  Location: [facility] ENDOSCOPY;  Service: Gastroenterology   • NO PAST SURGERIES       Family History   Problem Relation Age of Onset   • Diabetes Mother    • Stomach cancer Maternal Grandfather    • Colon cancer Neg Hx    • Colon polyps Neg Hx      Social History     Tobacco Use   • Smoking status: Never Smoker   • Smokeless tobacco: Never Used   Substance Use Topics   • Alcohol use: Yes     Comment: occ   • Drug use: No       Review of Systems  Review of Systems   Constitutional: Negative for chills, fatigue and fever.   HENT: Positive for congestion, ear pain (left, feels full, has popped some), postnasal drip, sinus pressure and sinus pain. Negative for rhinorrhea, sneezing, sore " "throat and trouble swallowing.    Eyes: Negative.    Respiratory: Negative for cough (worse left maxillary, left frontal around eye), shortness of breath and wheezing.    Neurological: Positive for headaches (frontal).       Objective   /83   Pulse 99   Temp 98.1 °F (36.7 °C) (Oral)   Resp 16   Ht 180.3 cm (71\")   Wt 108 kg (239 lb)   SpO2 98%   BMI 33.33 kg/m²       Physical Exam   Constitutional: He is oriented to person, place, and time. He appears well-developed and well-nourished. He is cooperative.  Non-toxic appearance. No distress.   HENT:   Head: Normocephalic and atraumatic.   Right Ear: Tympanic membrane, external ear and ear canal normal. Tympanic membrane is not perforated, not erythematous, not retracted and not bulging. No middle ear effusion.   Left Ear: External ear and ear canal normal. Tympanic membrane is not perforated, not erythematous, not retracted and not bulging. A middle ear effusion is present.   Nose: Mucosal edema (mild left > right) present. Sinus tenderness: has had primary left maxillary tenderness, none on palpation currently. Right sinus exhibits no frontal sinus tenderness. Left sinus exhibits no frontal sinus tenderness.   Mouth/Throat: Uvula is midline and mucous membranes are normal. Posterior oropharyngeal erythema (mild erythema, cobblestoning, PND posterior walld) present. Tonsils are 1+ on the right. Tonsils are 1+ on the left.   Eyes: Conjunctivae, EOM and lids are normal. Pupils are equal, round, and reactive to light.   Neck: Trachea normal and normal range of motion. Neck supple.   Cardiovascular: Normal rate, regular rhythm, S1 normal, S2 normal and normal heart sounds.   Pulmonary/Chest: Effort normal and breath sounds normal. He has no wheezes. He has no rhonchi. He has no rales.   Lymphadenopathy:     He has no cervical adenopathy.   Neurological: He is alert and oriented to person, place, and time. Coordination and gait normal.   Skin: Skin is warm, " dry and intact.   Psychiatric: He has a normal mood and affect. His speech is normal and behavior is normal.       Assessment/Plan     Danny was seen today for sinus problem.    Diagnoses and all orders for this visit:    Acute non-recurrent maxillary sinusitis  -     dexamethasone (DECADRON) injection 8 mg; Inject 2 mL into the appropriate muscle as directed by prescriber 1 (One) Time.    Eustachian tube dysfunction, left    Other orders  -     amoxicillin-clavulanate (AUGMENTIN) 875-125 MG per tablet; Take 1 tablet by mouth 2 (Two) Times a Day for 10 days.  -     fluticasone (FLONASE) 50 MCG/ACT nasal spray; 2 sprays into the nostril(s) as directed by provider Daily for 14 days.    Risks and benefits of steroid discussed prior to injection.     Start Flonase as prescribed.     An antibiotic has been prescribed for you that needs to be taken as directed for the full length of treatment. It is recommended that you take a probiotic when taking an antibiotic. Probiotics are found over the counter in pill form and in some brands of yogurt.      Continue neti-pot as needed.     If symptoms persist follow up with your primary care provider or go to urgent care.

## 2018-12-27 NOTE — PATIENT INSTRUCTIONS
Risks and benefits of steroid discussed prior to injection.     Start Flonase as prescribed.     An antibiotic has been prescribed for you that needs to be taken as directed for the full length of treatment. It is recommended that you take a probiotic when taking an antibiotic. Probiotics are found over the counter in pill form and in some brands of yogurt.      Continue neti-pot as needed.       Sinusitis, Adult  Sinusitis is soreness and inflammation of your sinuses. Sinuses are hollow spaces in the bones around your face. Your sinuses are located:  · Around your eyes.  · In the middle of your forehead.  · Behind your nose.  · In your cheekbones.    Your sinuses and nasal passages are lined with a stringy fluid (mucus). Mucus normally drains out of your sinuses. When your nasal tissues become inflamed or swollen, the mucus can become trapped or blocked so air cannot flow through your sinuses. This allows bacteria, viruses, and funguses to grow, which leads to infection.  Sinusitis can develop quickly and last for 7?10 days (acute) or for more than 12 weeks (chronic). Sinusitis often develops after a cold.  What are the causes?  This condition is caused by anything that creates swelling in the sinuses or stops mucus from draining, including:  · Allergies.  · Asthma.  · Bacterial or viral infection.  · Abnormally shaped bones between the nasal passages.  · Nasal growths that contain mucus (nasal polyps).  · Narrow sinus openings.  · Pollutants, such as chemicals or irritants in the air.  · A foreign object stuck in the nose.  · A fungal infection. This is rare.    What increases the risk?  The following factors may make you more likely to develop this condition:  · Having allergies or asthma.  · Having had a recent cold or respiratory tract infection.  · Having structural deformities or blockages in your nose or sinuses.  · Having a weak immune system.  · Doing a lot of swimming or diving.  · Overusing nasal  sprays.  · Smoking.    What are the signs or symptoms?  The main symptoms of this condition are pain and a feeling of pressure around the affected sinuses. Other symptoms include:  · Upper toothache.  · Earache.  · Headache.  · Bad breath.  · Decreased sense of smell and taste.  · A cough that may get worse at night.  · Fatigue.  · Fever.  · Thick drainage from your nose. The drainage is often green and it may contain pus (purulent).  · Stuffy nose or congestion.  · Postnasal drip. This is when extra mucus collects in the throat or back of the nose.  · Swelling and warmth over the affected sinuses.  · Sore throat.  · Sensitivity to light.    How is this diagnosed?  This condition is diagnosed based on symptoms, a medical history, and a physical exam. To find out if your condition is acute or chronic, your health care provider may:  · Look in your nose for signs of nasal polyps.  · Tap over the affected sinus to check for signs of infection.  · View the inside of your sinuses using an imaging device that has a light attached (endoscope).    If your health care provider suspects that you have chronic sinusitis, you may also:  · Be tested for allergies.  · Have a sample of mucus taken from your nose (nasal culture) and checked for bacteria.  · Have a mucus sample examined to see if your sinusitis is related to an allergy.    If your sinusitis does not respond to treatment and it lasts longer than 8 weeks, you may have an MRI or CT scan to check your sinuses. These scans also help to determine how severe your infection is.  In rare cases, a bone biopsy may be done to rule out more serious types of fungal sinus disease.  How is this treated?  Treatment for sinusitis depends on the cause and whether your condition is chronic or acute. If a virus is causing your sinusitis, your symptoms will go away on their own within 10 days. You may be given medicines to relieve your symptoms, including:  · Topical nasal decongestants.  They shrink swollen nasal passages and let mucus drain from your sinuses.  · Antihistamines. These drugs block inflammation that is triggered by allergies. This can help to ease swelling in your nose and sinuses.  · Topical nasal corticosteroids. These are nasal sprays that ease inflammation and swelling in your nose and sinuses.  · Nasal saline washes. These rinses can help to get rid of thick mucus in your nose.    If your condition is caused by bacteria, you will be given an antibiotic medicine. If your condition is caused by a fungus, you will be given an antifungal medicine.  Surgery may be needed to correct underlying conditions, such as narrow nasal passages. Surgery may also be needed to remove polyps.  Follow these instructions at home:  Medicines  · Take, use, or apply over-the-counter and prescription medicines only as told by your health care provider. These may include nasal sprays.  · If you were prescribed an antibiotic medicine, take it as told by your health care provider. Do not stop taking the antibiotic even if you start to feel better.  Hydrate and Humidify  · Drink enough water to keep your urine clear or pale yellow. Staying hydrated will help to thin your mucus.  · Use a cool mist humidifier to keep the humidity level in your home above 50%.  · Inhale steam for 10-15 minutes, 3-4 times a day or as told by your health care provider. You can do this in the bathroom while a hot shower is running.  · Limit your exposure to cool or dry air.  Rest  · Rest as much as possible.  · Sleep with your head raised (elevated).  · Make sure to get enough sleep each night.  General instructions  · Apply a warm, moist washcloth to your face 3-4 times a day or as told by your health care provider. This will help with discomfort.  · Wash your hands often with soap and water to reduce your exposure to viruses and other germs. If soap and water are not available, use hand .  · Do not smoke. Avoid being  around people who are smoking (secondhand smoke).  · Keep all follow-up visits as told by your health care provider. This is important.  Contact a health care provider if:  · You have a fever.  · Your symptoms get worse.  · Your symptoms do not improve within 10 days.  Get help right away if:  · You have a severe headache.  · You have persistent vomiting.  · You have pain or swelling around your face or eyes.  · You have vision problems.  · You develop confusion.  · Your neck is stiff.  · You have trouble breathing.  This information is not intended to replace advice given to you by your health care provider. Make sure you discuss any questions you have with your health care provider.  Document Released: 12/18/2006 Document Revised: 08/13/2017 Document Reviewed: 10/12/2016  [employer] Interactive Patient Education © 2018 [employer]      Eustachian Tube Dysfunction  The eustachian tube connects the middle ear to the back of the nose. It regulates air pressure in the middle ear by allowing air to move between the ear and nose. It also helps to drain fluid from the middle ear space. When the eustachian tube does not function properly, air pressure, fluid, or both can build up in the middle ear.  Eustachian tube dysfunction can affect one or both ears.  What are the causes?  This condition happens when the eustachian tube becomes blocked or cannot open normally. This may result from:  · Ear infections.  · Colds and other upper respiratory infections.  · Allergies.  · Irritation, such as from cigarette smoke or acid from the stomach coming up into the esophagus (gastroesophageal reflux).  · Sudden changes in air pressure, such as from descending in an airplane.  · Abnormal growths in the nose or throat, such as nasal polyps, tumors, or enlarged tissue at the back of the throat (adenoids).    What increases the risk?  This condition may be more likely to develop in people who smoke and people who are overweight.  "Eustachian tube dysfunction may also be more likely to develop in children, especially children who have:  · Certain birth defects of the mouth, such as cleft palate.  · Large tonsils and adenoids.    What are the signs or symptoms?  Symptoms of this condition may include:  · A feeling of fullness in the ear.  · Ear pain.  · Clicking or popping noises in the ear.  · Ringing in the ear.  · Hearing loss.  · Loss of balance.    Symptoms may get worse when the air pressure around you changes, such as when you travel to an area of high elevation or fly on an airplane.  How is this diagnosed?  This condition may be diagnosed based on:  · Your symptoms.  · A physical exam of your ear, nose, and throat.  · Tests, such as those that measure:  ? The movement of your eardrum (tympanogram).  ? Your hearing (audiometry).    How is this treated?  Treatment depends on the cause and severity of your condition. If your symptoms are mild, you may be able to relieve your symptoms by moving air into (\"popping\") your ears. If you have symptoms of fluid in your ears, treatment may include:  · Decongestants.  · Antihistamines.  · Nasal sprays or ear drops that contain medicines that reduce swelling (steroids).    In some cases, you may need to have a procedure to drain the fluid in your eardrum (myringotomy). In this procedure, a small tube is placed in the eardrum to:  · Drain the fluid.  · Restore the air in the middle ear space.    Follow these instructions at home:  · Take over-the-counter and prescription medicines only as told by your health care provider.  · Use techniques to help pop your ears as recommended by your health care provider. These may include:  ? Chewing gum.  ? Yawning.  ? Frequent, forceful swallowing.  ? Closing your mouth, holding your nose closed, and gently blowing as if you are trying to blow air out of your nose.  · Do not do any of the following until your health care provider approves:  ? Travel to high " altitudes.  ? Fly in airplanes.  ? Work in a pressurized cabin or room.  ? Scuba dive.  · Keep your ears dry. Dry your ears completely after showering or bathing.  · Do not smoke.  · Keep all follow-up visits as told by your health care provider. This is important.  Contact a health care provider if:  · Your symptoms do not go away after treatment.  · Your symptoms come back after treatment.  · You are unable to pop your ears.  · You have:  ? A fever.  ? Pain in your ear.  ? Pain in your head or neck.  ? Fluid draining from your ear.  · Your hearing suddenly changes.  · You become very dizzy.  · You lose your balance.  This information is not intended to replace advice given to you by your health care provider. Make sure you discuss any questions you have with your health care provider.  Document Released: 01/13/2017 Document Revised: 05/25/2017 Document Reviewed: 01/06/2016  [employer] Interactive Patient Education © 2018 [employer]

## 2020-01-03 ENCOUNTER — OFFICE VISIT (OUTPATIENT)
Dept: RETAIL CLINIC | Facility: CLINIC | Age: 40
End: 2020-01-03

## 2020-01-03 VITALS
TEMPERATURE: 98 F | OXYGEN SATURATION: 97 % | SYSTOLIC BLOOD PRESSURE: 119 MMHG | DIASTOLIC BLOOD PRESSURE: 89 MMHG | RESPIRATION RATE: 18 BRPM | HEART RATE: 84 BPM

## 2020-01-03 DIAGNOSIS — J01.40 ACUTE NON-RECURRENT PANSINUSITIS: Primary | ICD-10-CM

## 2020-01-03 PROCEDURE — 99213 OFFICE O/P EST LOW 20 MIN: CPT | Performed by: NURSE PRACTITIONER

## 2020-01-03 RX ORDER — AMOXICILLIN AND CLAVULANATE POTASSIUM 875; 125 MG/1; MG/1
1 TABLET, FILM COATED ORAL 2 TIMES DAILY
Qty: 20 TABLET | Refills: 0 | Status: SHIPPED | OUTPATIENT
Start: 2020-01-03 | End: 2020-01-13

## 2020-01-03 NOTE — PROGRESS NOTES
Chief Complaint   Patient presents with   • Sinus Problem     Subjective   Danny Ponce is a 39 y.o. male who presents to the clinic today with complaints of:  Sinus Problem   This is a new problem. Episode onset: about nine days ago. Progression since onset: seemed to be improving then worse the last few days. There has been no fever. Associated symptoms include congestion, ear pain (left ear fullness), headaches (frontal/sinus) and sinus pressure. Pertinent negatives include no chills, coughing, shortness of breath or sore throat. Treatments tried: Mucinex. The treatment provided no relief.     Current Outpatient Medications:   None reported    Allergies:  Patient has no known allergies.    History reviewed. No pertinent past medical history.  Past Surgical History:   Procedure Laterality Date   • COLONOSCOPY N/A 3/23/2018    Procedure: COLONOSCOPY WITH ANESTHESIA;  Surgeon: Tomas Mendez MD;  Location: [facility] ENDOSCOPY;  Service: Gastroenterology   • NO PAST SURGERIES       Family History   Problem Relation Age of Onset   • Diabetes Mother    • Stomach cancer Maternal Grandfather    • Colon cancer Neg Hx    • Colon polyps Neg Hx      Social History     Tobacco Use   • Smoking status: Never Smoker   • Smokeless tobacco: Never Used   Substance Use Topics   • Alcohol use: Yes     Comment: occ   • Drug use: No       Review of Systems  Review of Systems   Constitutional: Negative for chills and fever.   HENT: Positive for congestion, ear pain (left ear fullness), postnasal drip, sinus pressure and sinus pain. Negative for sore throat and trouble swallowing.    Respiratory: Negative for cough and shortness of breath.    Gastrointestinal: Negative for abdominal pain, diarrhea, nausea and vomiting.   Neurological: Positive for headaches (frontal/sinus).       Objective   /89 (BP Location: Left arm, Patient Position: Sitting, Cuff Size: Adult)   Pulse 84   Temp 98 °F (36.7 °C) (Oral)   Resp 18   SpO2  97%       Physical Exam   Constitutional: He is oriented to person, place, and time. He appears well-developed and well-nourished. He is cooperative.  Non-toxic appearance. No distress.   HENT:   Head: Normocephalic and atraumatic.   Right Ear: Tympanic membrane, external ear and ear canal normal.   Left Ear: Tympanic membrane, external ear and ear canal normal.   Nose: Right sinus exhibits maxillary sinus tenderness and frontal sinus tenderness. Left sinus exhibits maxillary sinus tenderness and frontal sinus tenderness.   Mouth/Throat: Uvula is midline and mucous membranes are normal. Posterior oropharyngeal erythema (mild erythema and cobblestoning) present.   Eyes: Pupils are equal, round, and reactive to light. Conjunctivae, EOM and lids are normal.   Neck: Trachea normal and normal range of motion. Neck supple.   Cardiovascular: Normal rate, regular rhythm, S1 normal, S2 normal and normal heart sounds.   Pulmonary/Chest: Effort normal and breath sounds normal. He has no wheezes. He has no rhonchi. He has no rales.   Lymphadenopathy:     He has no cervical adenopathy.   Neurological: He is alert and oriented to person, place, and time. Coordination and gait normal.   Skin: Skin is warm, dry and intact.   Psychiatric: He has a normal mood and affect. His speech is normal and behavior is normal.   Vitals reviewed.      Assessment/Plan     Danny was seen today for sinus problem.    Diagnoses and all orders for this visit:    Acute non-recurrent pansinusitis    Other orders  -     amoxicillin-clavulanate (AUGMENTIN) 875-125 MG per tablet; Take 1 tablet by mouth 2 (Two) Times a Day for 10 days.      Continue Mucinex and increase fluid intake.     Restart Flonase (he has this at home) 2 sprays each nostril daily.     If no improvement follow up with PCP or go to urgent care.     He declined AVS.

## 2020-07-13 ENCOUNTER — OFFICE VISIT (OUTPATIENT)
Dept: INTERNAL MEDICINE | Facility: CLINIC | Age: 40
End: 2020-07-13

## 2020-07-13 VITALS
SYSTOLIC BLOOD PRESSURE: 128 MMHG | WEIGHT: 242.5 LBS | HEART RATE: 82 BPM | DIASTOLIC BLOOD PRESSURE: 90 MMHG | HEIGHT: 71 IN | TEMPERATURE: 98.3 F | BODY MASS INDEX: 33.95 KG/M2 | OXYGEN SATURATION: 99 % | RESPIRATION RATE: 16 BRPM

## 2020-07-13 DIAGNOSIS — M99.05 SOMATIC DYSFUNCTION OF PELVIC REGION: ICD-10-CM

## 2020-07-13 DIAGNOSIS — M54.2 CHRONIC NECK PAIN: ICD-10-CM

## 2020-07-13 DIAGNOSIS — M99.06 SOMATIC DYSFUNCTION OF LOWER EXTREMITY: ICD-10-CM

## 2020-07-13 DIAGNOSIS — M99.09 SEGMENTAL AND SOMATIC DYSFUNCTION OF ABDOMEN AND OTHER REGIONS: ICD-10-CM

## 2020-07-13 DIAGNOSIS — M99.01 SOMATIC DYSFUNCTION OF SPINE, CERVICAL: ICD-10-CM

## 2020-07-13 DIAGNOSIS — L65.9 HAIR LOSS: ICD-10-CM

## 2020-07-13 DIAGNOSIS — Z76.89 ENCOUNTER TO ESTABLISH CARE: Primary | ICD-10-CM

## 2020-07-13 DIAGNOSIS — M99.08 SEGMENTAL AND SOMATIC DYSFUNCTION OF RIB CAGE: ICD-10-CM

## 2020-07-13 DIAGNOSIS — M99.04 SOMATIC DYSFUNCTION OF SPINE, SACRAL: ICD-10-CM

## 2020-07-13 DIAGNOSIS — M99.03 SOMATIC DYSFUNCTION OF SPINE, LUMBAR: ICD-10-CM

## 2020-07-13 DIAGNOSIS — E66.9 OBESITY (BMI 30.0-34.9): ICD-10-CM

## 2020-07-13 DIAGNOSIS — M99.02 SOMATIC DYSFUNCTION OF SPINE, THORACIC: ICD-10-CM

## 2020-07-13 DIAGNOSIS — R03.0 ELEVATED BLOOD PRESSURE READING: ICD-10-CM

## 2020-07-13 DIAGNOSIS — G89.29 CHRONIC NECK PAIN: ICD-10-CM

## 2020-07-13 PROBLEM — E66.811 OBESITY (BMI 30.0-34.9): Status: ACTIVE | Noted: 2020-07-13

## 2020-07-13 PROCEDURE — 98928 OSTEOPATH MANJ 7-8 REGIONS: CPT | Performed by: FAMILY MEDICINE

## 2020-07-13 PROCEDURE — 99203 OFFICE O/P NEW LOW 30 MIN: CPT | Performed by: FAMILY MEDICINE

## 2020-07-13 RX ORDER — FINASTERIDE 1 MG/1
1 TABLET, FILM COATED ORAL DAILY
COMMUNITY
End: 2022-02-25
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
## 2021-12-05 ENCOUNTER — APPOINTMENT (OUTPATIENT)
Dept: GENERAL RADIOLOGY | Facility: HOSPITAL | Age: 41
End: 2021-12-05

## 2021-12-05 ENCOUNTER — APPOINTMENT (OUTPATIENT)
Dept: CT IMAGING | Facility: HOSPITAL | Age: 41
End: 2021-12-05

## 2021-12-05 ENCOUNTER — HOSPITAL ENCOUNTER (EMERGENCY)
Facility: HOSPITAL | Age: 41
Discharge: HOME | End: 2021-12-05
Admitting: EMERGENCY MEDICINE

## 2021-12-05 VITALS
OXYGEN SATURATION: 98 % | RESPIRATION RATE: 20 BRPM | WEIGHT: 244 LBS | BODY MASS INDEX: 34.16 KG/M2 | DIASTOLIC BLOOD PRESSURE: 82 MMHG | HEART RATE: 75 BPM | SYSTOLIC BLOOD PRESSURE: 118 MMHG | HEIGHT: 71 IN | TEMPERATURE: 98.8 F

## 2021-12-05 DIAGNOSIS — S01.81XA CHIN LACERATION, INITIAL ENCOUNTER: ICD-10-CM

## 2021-12-05 DIAGNOSIS — S01.512A LACERATION OF TONGUE, INITIAL ENCOUNTER: Primary | ICD-10-CM

## 2021-12-05 DIAGNOSIS — S01.511A LACERATION OF LOWER LIP, INITIAL ENCOUNTER: ICD-10-CM

## 2021-12-05 PROCEDURE — 72125 CT NECK SPINE W/O DYE: CPT

## 2021-12-05 PROCEDURE — 25010000002 CEFAZOLIN PER 500 MG: Performed by: NURSE PRACTITIONER

## 2021-12-05 PROCEDURE — 25010000002 ONDANSETRON PER 1 MG: Performed by: NURSE PRACTITIONER

## 2021-12-05 PROCEDURE — 99283 EMERGENCY DEPT VISIT LOW MDM: CPT

## 2021-12-05 PROCEDURE — 0 HYDROMORPHONE 1 MG/ML SOLUTION: Performed by: NURSE PRACTITIONER

## 2021-12-05 PROCEDURE — 96375 TX/PRO/DX INJ NEW DRUG ADDON: CPT

## 2021-12-05 PROCEDURE — 71045 X-RAY EXAM CHEST 1 VIEW: CPT

## 2021-12-05 PROCEDURE — 70486 CT MAXILLOFACIAL W/O DYE: CPT

## 2021-12-05 PROCEDURE — 70450 CT HEAD/BRAIN W/O DYE: CPT

## 2021-12-05 PROCEDURE — 96365 THER/PROPH/DIAG IV INF INIT: CPT

## 2021-12-05 RX ORDER — ONDANSETRON 2 MG/ML
4 INJECTION INTRAMUSCULAR; INTRAVENOUS ONCE
Status: COMPLETED | OUTPATIENT
Start: 2021-12-05 | End: 2021-12-05

## 2021-12-05 RX ORDER — SODIUM CHLORIDE 0.9 % (FLUSH) 0.9 %
10 SYRINGE (ML) INJECTION AS NEEDED
Status: DISCONTINUED | OUTPATIENT
Start: 2021-12-05 | End: 2021-12-05 | Stop reason: HOSPADM

## 2021-12-05 RX ORDER — BUPIVACAINE HCL/0.9 % NACL/PF 0.1 %
2 PLASTIC BAG, INJECTION (ML) EPIDURAL ONCE
Status: COMPLETED | OUTPATIENT
Start: 2021-12-05 | End: 2021-12-05

## 2021-12-05 RX ORDER — HYDROCODONE BITARTRATE AND ACETAMINOPHEN 7.5; 325 MG/1; MG/1
1 TABLET ORAL EVERY 6 HOURS PRN
Qty: 12 TABLET | Refills: 0 | Status: SHIPPED | OUTPATIENT
Start: 2021-12-05 | End: 2021-12-08

## 2021-12-05 RX ORDER — LIDOCAINE HYDROCHLORIDE AND EPINEPHRINE BITARTRATE 20; .01 MG/ML; MG/ML
10 INJECTION, SOLUTION SUBCUTANEOUS ONCE
Status: COMPLETED | OUTPATIENT
Start: 2021-12-05 | End: 2021-12-05

## 2021-12-05 RX ADMIN — CEFAZOLIN SODIUM 2 G: 10 INJECTION, POWDER, FOR SOLUTION INTRAVENOUS at 16:21

## 2021-12-05 RX ADMIN — ONDANSETRON 4 MG: 2 INJECTION INTRAMUSCULAR; INTRAVENOUS at 16:18

## 2021-12-05 RX ADMIN — HYDROMORPHONE HYDROCHLORIDE 1 MG: 1 INJECTION, SOLUTION INTRAMUSCULAR; INTRAVENOUS; SUBCUTANEOUS at 16:17

## 2021-12-05 RX ADMIN — LIDOCAINE HYDROCHLORIDE,EPINEPHRINE BITARTRATE 10 ML: 20; .01 INJECTION, SOLUTION INFILTRATION; PERINEURAL at 18:14
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
## 2021-12-06 NOTE — DISCHARGE INSTRUCTIONS
Increase fluid intake which can include such things as popsicles.  Warm salt water swish and spit several times a day.  Medication as ordered.  Follow up with Dr Henriquez in one week.  Follow up with PCP - call tomorrow for appointment. Return to ED if condition does not improve or worsens
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
## 2022-02-25 ENCOUNTER — OFFICE VISIT (OUTPATIENT)
Dept: FAMILY MEDICINE CLINIC | Facility: CLINIC | Age: 42
End: 2022-02-25

## 2022-02-25 ENCOUNTER — LAB (OUTPATIENT)
Dept: LAB | Facility: HOSPITAL | Age: 42
End: 2022-02-25

## 2022-02-25 VITALS
TEMPERATURE: 96.7 F | BODY MASS INDEX: 33.57 KG/M2 | WEIGHT: 239.8 LBS | HEIGHT: 71 IN | HEART RATE: 79 BPM | DIASTOLIC BLOOD PRESSURE: 82 MMHG | SYSTOLIC BLOOD PRESSURE: 122 MMHG | OXYGEN SATURATION: 98 %

## 2022-02-25 DIAGNOSIS — E66.9 OBESITY (BMI 30.0-34.9): ICD-10-CM

## 2022-02-25 DIAGNOSIS — Z82.49 FAMILY HISTORY OF EARLY CAD: ICD-10-CM

## 2022-02-25 DIAGNOSIS — Z11.59 ENCOUNTER FOR HEPATITIS C SCREENING TEST FOR LOW RISK PATIENT: ICD-10-CM

## 2022-02-25 DIAGNOSIS — Z00.00 ANNUAL PHYSICAL EXAM: ICD-10-CM

## 2022-02-25 DIAGNOSIS — Z00.00 ANNUAL PHYSICAL EXAM: Primary | ICD-10-CM

## 2022-02-25 LAB
ALBUMIN SERPL-MCNC: 4.4 G/DL (ref 3.5–5.2)
ALBUMIN/GLOB SERPL: 1.7 G/DL
ALP SERPL-CCNC: 93 U/L (ref 39–117)
ALT SERPL W P-5'-P-CCNC: 38 U/L (ref 1–41)
ANION GAP SERPL CALCULATED.3IONS-SCNC: 9.1 MMOL/L (ref 5–15)
AST SERPL-CCNC: 22 U/L (ref 1–40)
BILIRUB SERPL-MCNC: 0.9 MG/DL (ref 0–1.2)
BUN SERPL-MCNC: 15 MG/DL (ref 6–20)
BUN/CREAT SERPL: 13.8 (ref 7–25)
CALCIUM SPEC-SCNC: 9.7 MG/DL (ref 8.6–10.5)
CHLORIDE SERPL-SCNC: 103 MMOL/L (ref 98–107)
CHOLEST SERPL-MCNC: 178 MG/DL (ref 0–200)
CO2 SERPL-SCNC: 26.9 MMOL/L (ref 22–29)
CREAT SERPL-MCNC: 1.09 MG/DL (ref 0.76–1.27)
DEPRECATED RDW RBC AUTO: 43.9 FL (ref 37–54)
ERYTHROCYTE [DISTWIDTH] IN BLOOD BY AUTOMATED COUNT: 13.5 % (ref 12.3–15.4)
GFR SERPL CREATININE-BSD FRML MDRD: 75 ML/MIN/1.73
GLOBULIN UR ELPH-MCNC: 2.6 GM/DL
GLUCOSE SERPL-MCNC: 104 MG/DL (ref 65–99)
HCT VFR BLD AUTO: 51.5 % (ref 37.5–51)
HDLC SERPL-MCNC: 33 MG/DL (ref 40–60)
HGB BLD-MCNC: 17.9 G/DL (ref 13–17.7)
LDLC SERPL CALC-MCNC: 111 MG/DL (ref 0–100)
LDLC/HDLC SERPL: 3.21 {RATIO}
MCH RBC QN AUTO: 31.4 PG (ref 26.6–33)
MCHC RBC AUTO-ENTMCNC: 34.8 G/DL (ref 31.5–35.7)
MCV RBC AUTO: 90.4 FL (ref 79–97)
PLATELET # BLD AUTO: 218 10*3/MM3 (ref 140–450)
PMV BLD AUTO: 10.5 FL (ref 6–12)
POTASSIUM SERPL-SCNC: 4.6 MMOL/L (ref 3.5–5.2)
PROT SERPL-MCNC: 7 G/DL (ref 6–8.5)
RBC # BLD AUTO: 5.7 10*6/MM3 (ref 4.14–5.8)
SODIUM SERPL-SCNC: 139 MMOL/L (ref 136–145)
TRIGL SERPL-MCNC: 196 MG/DL (ref 0–150)
TSH SERPL DL<=0.05 MIU/L-ACNC: 2.84 UIU/ML (ref 0.27–4.2)
VLDLC SERPL-MCNC: 34 MG/DL (ref 5–40)
WBC NRBC COR # BLD: 5.64 10*3/MM3 (ref 3.4–10.8)

## 2022-02-25 PROCEDURE — 99396 PREV VISIT EST AGE 40-64: CPT | Performed by: FAMILY MEDICINE

## 2022-02-25 PROCEDURE — 80061 LIPID PANEL: CPT

## 2022-02-25 PROCEDURE — 36415 COLL VENOUS BLD VENIPUNCTURE: CPT

## 2022-02-25 PROCEDURE — 80050 GENERAL HEALTH PANEL: CPT

## 2022-02-25 PROCEDURE — 93000 ELECTROCARDIOGRAM COMPLETE: CPT | Performed by: FAMILY MEDICINE

## 2022-02-25 NOTE — PROGRESS NOTES
"Chief Complaint  Annual Exam (DECLINED FLU VACCINE, WILL SEND INFORMATION REGARDING COVID VACCINATION)    Subjective          Danny Ponce presents to [facility]  History of Present Illness  Here for annual exam  Lost weight since December  Plans to go back to gym in next few weeks  Says that father had CAD diagnosed in his 40s. ROS completely negative today    Past Medical History:   Diagnosis Date   • Headache    • Neck pain      Past Surgical History:   Procedure Laterality Date   • COLONOSCOPY N/A 3/23/2018    Procedure: COLONOSCOPY WITH ANESTHESIA;  Surgeon: Tomas Mendez MD;  Location: [facility] ENDOSCOPY;  Service: Gastroenterology   • NO PAST SURGERIES       Family History   Problem Relation Age of Onset   • Diabetes Mother    • Stroke Mother    • Clotting disorder Mother    • Irregular heart beat Mother    • Pulmonary embolism Mother    • Stomach cancer Maternal Grandfather    • Irregular heart beat Father    • Heart disease Father    • Alcohol abuse Paternal Grandfather    • Colon cancer Neg Hx    • Colon polyps Neg Hx        Objective   Vital Signs:   /82 (BP Location: Right arm, Patient Position: Sitting, Cuff Size: Adult)   Pulse 79   Temp 96.7 °F (35.9 °C) (Temporal)   Ht 180.3 cm (71\")   Wt 109 kg (239 lb 12.8 oz)   SpO2 98%   BMI 33.45 kg/m²     Physical Exam  Constitutional:       General: He is not in acute distress.     Appearance: Normal appearance. He is not ill-appearing or diaphoretic.   HENT:      Head: Normocephalic and atraumatic.      Right Ear: Tympanic membrane and external ear normal.      Left Ear: Tympanic membrane and external ear normal.      Nose: Nose normal. No congestion or rhinorrhea.      Mouth/Throat:      Mouth: Mucous membranes are moist.      Pharynx: Oropharynx is clear. No oropharyngeal exudate or posterior oropharyngeal erythema.   Eyes:      General: No scleral icterus.        Right eye: No discharge.         Left " eye: No discharge.      Extraocular Movements: Extraocular movements intact.      Conjunctiva/sclera: Conjunctivae normal.      Pupils: Pupils are equal, round, and reactive to light.   Neck:      Thyroid: No thyromegaly.      Vascular: No JVD.   Cardiovascular:      Rate and Rhythm: Normal rate and regular rhythm.      Pulses: Normal pulses.      Heart sounds: Normal heart sounds. No murmur heard.  No friction rub. No gallop.    Pulmonary:      Effort: Pulmonary effort is normal.      Breath sounds: Normal breath sounds.   Abdominal:      General: Bowel sounds are normal. There is no distension.      Palpations: Abdomen is soft. There is no mass.      Tenderness: There is no abdominal tenderness. There is no guarding or rebound.   Musculoskeletal:         General: No deformity or signs of injury.      Cervical back: Neck supple.      Right lower leg: No edema.      Left lower leg: No edema.   Lymphadenopathy:      Cervical: No cervical adenopathy.   Skin:     General: Skin is warm and dry.      Capillary Refill: Capillary refill takes less than 2 seconds.      Coloration: Skin is not jaundiced or pale.   Neurological:      Mental Status: He is alert and oriented to person, place, and time. Mental status is at baseline.   Psychiatric:         Mood and Affect: Mood normal.         Behavior: Behavior normal.         Thought Content: Thought content normal.         Judgment: Judgment normal.        Result Review :                 Assessment and Plan    Diagnoses and all orders for this visit:    1. Annual physical exam (Primary)  -     Lipid panel; Future  -     Comprehensive Metabolic Panel; Future  -     CBC No Differential; Future    2. Encounter for hepatitis C screening test for low risk patient    3. Family history of early CAD  -     ECG 12 Lead    4. Obesity (BMI 30.0-34.9)  -     TSH; Future    Patient's Body mass index is 33.45 kg/m². indicating that he is obese (BMI >30). Obesity-related health conditions  include the following: none. Obesity is improving with lifestyle modifications. BMI is is above average; BMI management plan is completed. We discussed increasing exercise and joining a fitness center or start home based exercise program..    ECG reviewed by RAEGAN beebeR without ischemic change  Labs above  F/u yearly

## 2022-08-12 ENCOUNTER — OFFICE VISIT (OUTPATIENT)
Dept: FAMILY MEDICINE CLINIC | Facility: CLINIC | Age: 42
End: 2022-08-12

## 2022-08-12 VITALS
TEMPERATURE: 97.2 F | HEIGHT: 71 IN | HEART RATE: 100 BPM | WEIGHT: 244.1 LBS | SYSTOLIC BLOOD PRESSURE: 123 MMHG | BODY MASS INDEX: 34.17 KG/M2 | DIASTOLIC BLOOD PRESSURE: 85 MMHG | OXYGEN SATURATION: 99 %

## 2022-08-12 DIAGNOSIS — R07.9 CHEST PAIN, UNSPECIFIED TYPE: Primary | ICD-10-CM

## 2022-08-12 PROCEDURE — 99214 OFFICE O/P EST MOD 30 MIN: CPT | Performed by: FAMILY MEDICINE

## 2022-08-12 RX ORDER — ASPIRIN 81 MG/1
TABLET ORAL
COMMUNITY

## 2022-08-22 ENCOUNTER — HOSPITAL ENCOUNTER (OUTPATIENT)
Dept: CARDIOLOGY | Facility: HOSPITAL | Age: 42
Discharge: HOME | End: 2022-08-22
Admitting: FAMILY MEDICINE

## 2022-08-22 VITALS
SYSTOLIC BLOOD PRESSURE: 104 MMHG | DIASTOLIC BLOOD PRESSURE: 78 MMHG | BODY MASS INDEX: 32.9 KG/M2 | HEIGHT: 71 IN | WEIGHT: 235 LBS | HEART RATE: 78 BPM

## 2022-08-22 DIAGNOSIS — R07.9 CHEST PAIN, UNSPECIFIED TYPE: ICD-10-CM

## 2022-08-22 PROCEDURE — 93017 CV STRESS TEST TRACING ONLY: CPT

## 2022-08-22 PROCEDURE — 93018 CV STRESS TEST I&R ONLY: CPT | Performed by: EMERGENCY MEDICINE

## 2022-08-23 LAB
BH CV STRESS BP STAGE 1: NORMAL
BH CV STRESS BP STAGE 2: NORMAL
BH CV STRESS BP STAGE 3: NORMAL
BH CV STRESS BP STAGE 4: NORMAL
BH CV STRESS DURATION MIN STAGE 1: 3
BH CV STRESS DURATION MIN STAGE 2: 3
BH CV STRESS DURATION MIN STAGE 3: 3
BH CV STRESS DURATION MIN STAGE 4: 0
BH CV STRESS DURATION SEC STAGE 1: 0
BH CV STRESS DURATION SEC STAGE 2: 0
BH CV STRESS DURATION SEC STAGE 3: 0
BH CV STRESS DURATION SEC STAGE 4: 57
BH CV STRESS GRADE STAGE 1: 10
BH CV STRESS GRADE STAGE 2: 12
BH CV STRESS GRADE STAGE 3: 14
BH CV STRESS GRADE STAGE 4: 16
BH CV STRESS HR STAGE 1: 104
BH CV STRESS HR STAGE 2: 129
BH CV STRESS HR STAGE 3: 169
BH CV STRESS HR STAGE 4: 180
BH CV STRESS METS STAGE 1: 5
BH CV STRESS METS STAGE 2: 7.5
BH CV STRESS METS STAGE 3: 10
BH CV STRESS METS STAGE 4: 13.5
BH CV STRESS PROTOCOL 1: NORMAL
BH CV STRESS RECOVERY BP: NORMAL MMHG
BH CV STRESS RECOVERY HR: 98 BPM
BH CV STRESS SPEED STAGE 1: 1.7
BH CV STRESS SPEED STAGE 2: 2.5
BH CV STRESS SPEED STAGE 3: 3.4
BH CV STRESS SPEED STAGE 4: 4.2
BH CV STRESS STAGE 1: 1
BH CV STRESS STAGE 2: 2
BH CV STRESS STAGE 3: 3
BH CV STRESS STAGE 4: 4
MAXIMAL PREDICTED HEART RATE: 178 BPM
PERCENT MAX PREDICTED HR: 101.12 %
STRESS BASELINE BP: NORMAL MMHG
STRESS BASELINE HR: 74 BPM
STRESS PERCENT HR: 119 %
STRESS POST ESTIMATED WORKLOAD: 13.5 METS
STRESS POST EXERCISE DUR MIN: 9 MIN
STRESS POST EXERCISE DUR SEC: 57 SEC
STRESS POST PEAK BP: NORMAL MMHG
STRESS POST PEAK HR: 180 BPM
STRESS TARGET HR: 151 BPM

## 2022-09-01 NOTE — PROGRESS NOTES
"Chief Complaint  Chest Pain ([facility] on 8/5/22, chest pain, normal EKG.  Patient reports improvement.  Family hx blood clots, brother passed away in August 2016.  BERNA-7=7, PHQ-2=0.  )    Subjective        Danny Ponce presents to [facility]  History of Present Illness  Seen in the urgent care on 8/5/2022 for chest pain with normal EKG, chest pain is currently resolved, feels well today but nervous about heart disease. Able to exercise and exert himself without worsening.     Past Medical History:   Diagnosis Date   • Headache    • Hypertension 12/6/18   • Neck pain      Past Surgical History:   Procedure Laterality Date   • COLONOSCOPY N/A 3/23/2018    Procedure: COLONOSCOPY WITH ANESTHESIA;  Surgeon: Tomas Mendez MD;  Location: [facility] ENDOSCOPY;  Service: Gastroenterology   • NO PAST SURGERIES       Family History   Problem Relation Age of Onset   • Diabetes Mother    • Stroke Mother    • Clotting disorder Mother    • Irregular heart beat Mother    • Pulmonary embolism Mother    • Stomach cancer Maternal Grandfather    • Irregular heart beat Father    • Heart disease Father    • Alcohol abuse Paternal Grandfather    • Colon cancer Neg Hx    • Colon polyps Neg Hx      No Known Allergies    Objective   Vital Signs:  /85 (BP Location: Left arm, Patient Position: Sitting, Cuff Size: Adult)   Pulse 100   Temp 97.2 °F (36.2 °C) (Temporal)   Ht 180.3 cm (71\")   Wt 111 kg (244 lb 1.6 oz)   SpO2 99%   BMI 34.05 kg/m²   Estimated body mass index is 34.05 kg/m² as calculated from the following:    Height as of this encounter: 180.3 cm (71\").    Weight as of this encounter: 111 kg (244 lb 1.6 oz).          Physical Exam  Vitals and nursing note reviewed.   Constitutional:       General: He is not in acute distress.     Appearance: He is not diaphoretic.   HENT:      Head: Normocephalic and atraumatic.      Nose: Nose normal.   Eyes:      General: No scleral icterus.       "  Right eye: No discharge.         Left eye: No discharge.      Conjunctiva/sclera: Conjunctivae normal.   Neck:      Trachea: No tracheal deviation.   Cardiovascular:      Rate and Rhythm: Normal rate and regular rhythm.      Heart sounds: Normal heart sounds. No murmur heard.    No friction rub. No gallop.   Pulmonary:      Effort: Pulmonary effort is normal. No respiratory distress.      Breath sounds: Normal breath sounds. No wheezing or rales.   Skin:     General: Skin is warm and dry.      Coloration: Skin is not pale.   Neurological:      Mental Status: He is alert and oriented to person, place, and time.   Psychiatric:         Behavior: Behavior normal.         Thought Content: Thought content normal.         Judgment: Judgment normal.        Result Review :                Assessment and Plan   Diagnoses and all orders for this visit:    1. Chest pain, unspecified type (Primary)  -     Cancel: Treadmill Stress Test; Future  -     Treadmill Stress Test; Future    suspect atypical chest pain, but will order treadmill stress test for further confirmation. F/u PRN        Answers for HPI/ROS submitted by the patient on 8/12/2022  What is the primary reason for your visit?: Other  Please describe your symptoms.: Follow up for urgent care visit. Had dizzy spell and chest tightness for several days. No longer have symptoms  Have you had these symptoms before?: No  How long have you been having these symptoms?: 1-4 days

## 2023-11-07 ENCOUNTER — LAB (OUTPATIENT)
Dept: LAB | Facility: HOSPITAL | Age: 43
End: 2023-11-07
Payer: COMMERCIAL

## 2023-11-07 ENCOUNTER — OFFICE VISIT (OUTPATIENT)
Dept: FAMILY MEDICINE CLINIC | Facility: CLINIC | Age: 43
End: 2023-11-07
Payer: COMMERCIAL

## 2023-11-07 VITALS
BODY MASS INDEX: 31.44 KG/M2 | HEIGHT: 71 IN | DIASTOLIC BLOOD PRESSURE: 81 MMHG | TEMPERATURE: 97.9 F | HEART RATE: 77 BPM | SYSTOLIC BLOOD PRESSURE: 112 MMHG | OXYGEN SATURATION: 98 % | WEIGHT: 224.6 LBS

## 2023-11-07 DIAGNOSIS — Z11.59 ENCOUNTER FOR HEPATITIS C SCREENING TEST FOR LOW RISK PATIENT: ICD-10-CM

## 2023-11-07 DIAGNOSIS — Z00.00 ANNUAL PHYSICAL EXAM: ICD-10-CM

## 2023-11-07 DIAGNOSIS — Z28.21 INFLUENZA VACCINATION DECLINED: ICD-10-CM

## 2023-11-07 DIAGNOSIS — Z00.00 ANNUAL PHYSICAL EXAM: Primary | ICD-10-CM

## 2023-11-07 LAB
ALBUMIN SERPL-MCNC: 4.5 G/DL (ref 3.5–5.2)
ALBUMIN/GLOB SERPL: 1.9 G/DL
ALP SERPL-CCNC: 81 U/L (ref 39–117)
ALT SERPL W P-5'-P-CCNC: 19 U/L (ref 1–41)
ANION GAP SERPL CALCULATED.3IONS-SCNC: 7 MMOL/L (ref 5–15)
AST SERPL-CCNC: 15 U/L (ref 1–40)
BILIRUB SERPL-MCNC: 1 MG/DL (ref 0–1.2)
BUN SERPL-MCNC: 10 MG/DL (ref 6–20)
BUN/CREAT SERPL: 11 (ref 7–25)
CALCIUM SPEC-SCNC: 9.2 MG/DL (ref 8.6–10.5)
CHLORIDE SERPL-SCNC: 103 MMOL/L (ref 98–107)
CHOLEST SERPL-MCNC: 165 MG/DL (ref 0–200)
CO2 SERPL-SCNC: 29 MMOL/L (ref 22–29)
CREAT SERPL-MCNC: 0.91 MG/DL (ref 0.76–1.27)
DEPRECATED RDW RBC AUTO: 39.3 FL (ref 37–54)
EGFRCR SERPLBLD CKD-EPI 2021: 107.2 ML/MIN/1.73
ERYTHROCYTE [DISTWIDTH] IN BLOOD BY AUTOMATED COUNT: 12.7 % (ref 12.3–15.4)
GLOBULIN UR ELPH-MCNC: 2.4 GM/DL
GLUCOSE SERPL-MCNC: 96 MG/DL (ref 65–99)
HCT VFR BLD AUTO: 46.2 % (ref 37.5–51)
HCV AB SER DONR QL: NORMAL
HDLC SERPL-MCNC: 35 MG/DL (ref 40–60)
HGB BLD-MCNC: 16.6 G/DL (ref 13–17.7)
LDLC SERPL CALC-MCNC: 110 MG/DL (ref 0–100)
LDLC/HDLC SERPL: 3.11 {RATIO}
MCH RBC QN AUTO: 31 PG (ref 26.6–33)
MCHC RBC AUTO-ENTMCNC: 35.9 G/DL (ref 31.5–35.7)
MCV RBC AUTO: 86.4 FL (ref 79–97)
PLATELET # BLD AUTO: 218 10*3/MM3 (ref 140–450)
PMV BLD AUTO: 10.4 FL (ref 6–12)
POTASSIUM SERPL-SCNC: 4.4 MMOL/L (ref 3.5–5.2)
PROT SERPL-MCNC: 6.9 G/DL (ref 6–8.5)
RBC # BLD AUTO: 5.35 10*6/MM3 (ref 4.14–5.8)
SODIUM SERPL-SCNC: 139 MMOL/L (ref 136–145)
TRIGL SERPL-MCNC: 106 MG/DL (ref 0–150)
VLDLC SERPL-MCNC: 20 MG/DL (ref 5–40)
WBC NRBC COR # BLD: 7.08 10*3/MM3 (ref 3.4–10.8)

## 2023-11-07 PROCEDURE — 80053 COMPREHEN METABOLIC PANEL: CPT

## 2023-11-07 PROCEDURE — 86803 HEPATITIS C AB TEST: CPT

## 2023-11-07 PROCEDURE — 85027 COMPLETE CBC AUTOMATED: CPT

## 2023-11-07 PROCEDURE — 80061 LIPID PANEL: CPT

## 2023-11-07 PROCEDURE — 36415 COLL VENOUS BLD VENIPUNCTURE: CPT

## 2023-11-07 PROCEDURE — 99396 PREV VISIT EST AGE 40-64: CPT | Performed by: FAMILY MEDICINE

## 2023-11-21 NOTE — PROGRESS NOTES
"Chief Complaint  OFFICE VISIT (Pt states originally went to urgent care for stomach bug and then was having throat issues, and is resolved wanting to keep apt for a check up )    Subjective        Danny Ponce presents to [facility]  History of Present Illness  Feeling better after recent viral illness, here for annual exam, otherwise feels well with negative ROS.    Past Medical History:   Diagnosis Date    Headache     Hypertension 12/6/18    Neck pain      Past Surgical History:   Procedure Laterality Date    COLONOSCOPY N/A 3/23/2018    Procedure: COLONOSCOPY WITH ANESTHESIA;  Surgeon: Tomas Mendez MD;  Location: [facility] ENDOSCOPY;  Service: Gastroenterology    NO PAST SURGERIES       No Known Allergies  Family History   Problem Relation Age of Onset    Diabetes Mother     Stroke Mother     Clotting disorder Mother     Irregular heart beat Mother     Pulmonary embolism Mother     Stomach cancer Maternal Grandfather     Irregular heart beat Father     Heart disease Father     Alcohol abuse Paternal Grandfather     Colon cancer Neg Hx     Colon polyps Neg Hx      Social History     Tobacco Use    Smoking status: Former    Smokeless tobacco: Never   Substance Use Topics    Alcohol use: Yes     Alcohol/week: 2.0 standard drinks of alcohol     Types: 2 Cans of beer per week     Comment: occ    Drug use: No     Current Outpatient Medications   Medication Instructions    aspirin (Aspir-Low) 81 MG EC tablet No dose, route, or frequency recorded.    Ped Multivitamins-Fl-Iron (Multi-Vitamin/Fluoride/Iron) 0.25-10 MG/ML solution Oral       Objective   Vital Signs:  /81   Pulse 77   Temp 97.9 °F (36.6 °C)   Ht 180.3 cm (71\")   Wt 102 kg (224 lb 9.6 oz)   SpO2 98%   BMI 31.33 kg/m²   Estimated body mass index is 31.33 kg/m² as calculated from the following:    Height as of this encounter: 180.3 cm (71\").    Weight as of this encounter: 102 kg (224 lb 9.6 oz).         "       Physical Exam  Constitutional:       General: He is not in acute distress.     Appearance: Normal appearance. He is not ill-appearing or diaphoretic.   HENT:      Head: Normocephalic and atraumatic.      Right Ear: Tympanic membrane and external ear normal.      Left Ear: Tympanic membrane and external ear normal.      Nose: Nose normal. No congestion or rhinorrhea.      Mouth/Throat:      Mouth: Mucous membranes are moist.      Pharynx: Oropharynx is clear. No oropharyngeal exudate or posterior oropharyngeal erythema.   Eyes:      General: No scleral icterus.        Right eye: No discharge.         Left eye: No discharge.      Extraocular Movements: Extraocular movements intact.      Conjunctiva/sclera: Conjunctivae normal.      Pupils: Pupils are equal, round, and reactive to light.   Neck:      Thyroid: No thyromegaly.      Vascular: No JVD.   Cardiovascular:      Rate and Rhythm: Normal rate and regular rhythm.      Pulses: Normal pulses.      Heart sounds: Normal heart sounds. No murmur heard.     No friction rub. No gallop.   Pulmonary:      Effort: Pulmonary effort is normal.      Breath sounds: Normal breath sounds.   Abdominal:      General: Bowel sounds are normal. There is no distension.      Palpations: Abdomen is soft. There is no mass.      Tenderness: There is no abdominal tenderness. There is no guarding or rebound.   Musculoskeletal:         General: No deformity or signs of injury.      Cervical back: Neck supple.      Right lower leg: No edema.      Left lower leg: No edema.   Lymphadenopathy:      Cervical: No cervical adenopathy.   Skin:     General: Skin is warm and dry.      Capillary Refill: Capillary refill takes less than 2 seconds.      Coloration: Skin is not jaundiced or pale.   Neurological:      Mental Status: He is alert and oriented to person, place, and time. Mental status is at baseline.   Psychiatric:         Mood and Affect: Mood normal.         Behavior: Behavior normal.          Thought Content: Thought content normal.         Judgment: Judgment normal.        Result Review :                   Assessment and Plan   Diagnoses and all orders for this visit:    1. Annual physical exam (Primary)  -     CBC No Differential; Future  -     Comprehensive Metabolic Panel; Future  -     Lipid panel; Future    2. Influenza vaccination declined    3. Encounter for hepatitis C screening test for low risk patient  -     Hepatitis C antibody; Future    Preventative: Flu vaccine encouraged but patient declined today  Follow-up yearly

## 2024-01-18 ENCOUNTER — TELEPHONE (OUTPATIENT)
Dept: FAMILY MEDICINE CLINIC | Facility: CLINIC | Age: 44
End: 2024-01-18

## 2024-01-18 ENCOUNTER — LAB (OUTPATIENT)
Dept: LAB | Facility: HOSPITAL | Age: 44
End: 2024-01-18
Payer: COMMERCIAL

## 2024-01-18 ENCOUNTER — OFFICE VISIT (OUTPATIENT)
Dept: FAMILY MEDICINE CLINIC | Facility: CLINIC | Age: 44
End: 2024-01-18
Payer: COMMERCIAL

## 2024-01-18 VITALS
DIASTOLIC BLOOD PRESSURE: 70 MMHG | SYSTOLIC BLOOD PRESSURE: 112 MMHG | BODY MASS INDEX: 31.36 KG/M2 | RESPIRATION RATE: 20 BRPM | WEIGHT: 224 LBS | OXYGEN SATURATION: 98 % | HEIGHT: 71 IN | HEART RATE: 84 BPM | TEMPERATURE: 97.8 F

## 2024-01-18 DIAGNOSIS — E66.01 MORBID (SEVERE) OBESITY DUE TO EXCESS CALORIES: ICD-10-CM

## 2024-01-18 DIAGNOSIS — E66.09 CLASS 1 OBESITY DUE TO EXCESS CALORIES WITHOUT SERIOUS COMORBIDITY WITH BODY MASS INDEX (BMI) OF 31.0 TO 31.9 IN ADULT: ICD-10-CM

## 2024-01-18 DIAGNOSIS — K64.5 THROMBOSED EXTERNAL HEMORRHOID: Primary | ICD-10-CM

## 2024-01-18 PROBLEM — R10.32 LEFT LOWER QUADRANT PAIN: Status: RESOLVED | Noted: 2018-03-14 | Resolved: 2024-01-18

## 2024-01-18 PROBLEM — R03.0 ELEVATED BLOOD PRESSURE READING: Status: RESOLVED | Noted: 2020-07-13 | Resolved: 2024-01-18

## 2024-01-18 PROBLEM — K64.4 EXTERNAL HEMORRHOID: Status: ACTIVE | Noted: 2024-01-18

## 2024-01-18 LAB — HBA1C MFR BLD: 4.5 % (ref 4.8–5.9)

## 2024-01-18 PROCEDURE — 83036 HEMOGLOBIN GLYCOSYLATED A1C: CPT

## 2024-01-18 PROCEDURE — 99214 OFFICE O/P EST MOD 30 MIN: CPT | Performed by: STUDENT IN AN ORGANIZED HEALTH CARE EDUCATION/TRAINING PROGRAM

## 2024-01-18 PROCEDURE — 36415 COLL VENOUS BLD VENIPUNCTURE: CPT

## 2024-01-18 NOTE — PROGRESS NOTES
"       Chief Complaint  Rectal Pain (Bump growing on the service - has had some discharge seems to have some odor to it. )    Subjective        Danny Ponce presents to [facility]    HPI    Establish care  -Pt desires to move care here d/t proximity/convenience and wife is seen here as well    Anal nodule  -Pt is concerned about increasing size of palpable nodule perianally over last 2 weeks, starting as bb-sized now marble-sized. There is mild discomfort associated. Otherwise pt has noted intermittent anal discharge that he wasn't sure about over last 8-9 mo. Pt notes h/o external hemorrhoids in past which at times bleed. No bleeding currently. Typically he will use otc witch hazel or other topicals for symptomatic relief.  Has been feeling run down over last week.  In 2018 had CLN which was unremarkable aside from suggesting constipation  BM regular and normal-appearing per pt  No fever, chills or constitutional sx.  No family history of colon cancer    Past Medical History:   Diagnosis Date    Headache     Hypertension 12/6/18    Neck pain      Past Surgical History:   Procedure Laterality Date    COLONOSCOPY N/A 3/23/2018    Procedure: COLONOSCOPY WITH ANESTHESIA;  Surgeon: Tomas Mendez MD;  Location: [facility] ENDOSCOPY;  Service: Gastroenterology    NO PAST SURGERIES       Social History     Socioeconomic History    Marital status:    Tobacco Use    Smoking status: Former    Smokeless tobacco: Never   Substance and Sexual Activity    Alcohol use: Yes     Alcohol/week: 2.0 standard drinks of alcohol     Types: 2 Cans of beer per week     Comment: occ    Drug use: No    Sexual activity: Yes     Partners: Female     Birth control/protection: Condom       Objective   Vital Signs:  /70   Pulse 84   Temp 97.8 °F (36.6 °C) (Temporal)   Resp 20   Ht 180.3 cm (70.98\")   Wt 102 kg (224 lb)   SpO2 98%   BMI 31.26 kg/m²   Estimated body mass index is 31.26 kg/m² " "as calculated from the following:    Height as of this encounter: 180.3 cm (70.98\").    Weight as of this encounter: 102 kg (224 lb).              Physical Exam  Vitals reviewed. Exam conducted with a chaperone present.   Constitutional:       Appearance: Normal appearance.   HENT:      Head: Normocephalic.      Nose: Nose normal.      Mouth/Throat:      Mouth: Mucous membranes are moist.   Eyes:      Extraocular Movements: Extraocular movements intact.   Cardiovascular:      Rate and Rhythm: Normal rate and regular rhythm.      Heart sounds: Normal heart sounds.   Pulmonary:      Effort: Pulmonary effort is normal.      Breath sounds: Normal breath sounds.   Genitourinary:     Comments: Thrombosed external hemorrhoid  Musculoskeletal:         General: Normal range of motion.      Cervical back: Normal range of motion.   Skin:     General: Skin is warm and dry.   Neurological:      General: No focal deficit present.      Mental Status: He is alert and oriented to person, place, and time.   Psychiatric:         Mood and Affect: Mood normal.         Behavior: Behavior normal.        Result Review :                   Assessment and Plan   Diagnoses and all orders for this visit:    1. Thrombosed external hemorrhoid (Primary)  -D/w pt seeing general surgery vs monitoring given relatively low symptom level. Recommended good hydration, fiber supplementation, avoiding prolonged sitting/straining  -     Ambulatory Referral to General Surgery    2. Class 1 obesity due to excess calories without serious comorbidity with body mass index (BMI) of 31.0 to 31.9 in adult  -     Hemoglobin A1c; Future    FU 3 mo     I spent 30 minutes caring for Danny on this date of service. This time includes time spent by me in the following activities:preparing for the visit, performing a medically appropriate examination and/or evaluation , counseling and educating the patient/family/caregiver, referring and communicating with other health " care professionals , documenting information in the medical record, and care coordination  EMR Dragon/Transcription disclaimer:   Much of this encounter note is an electronic transcription/translation of spoken language to printed text. The electronic translation of spoken language may permit erroneous, or at times, nonsensical words or phrases to be inadvertently transcribed; although attempts have made to review the note for such errors, some may still exist. Please excuse any unrecognized transcription errors and contact us if the air is unintelligible or needs documented correction. Also, portions of this note have been copied forward, however, changed to reflect the most current clinical status of this patient.  Follow Up   No follow-ups on file.  Patient was given instructions and counseling regarding his condition or for health maintenance advice. Please see specific information pulled into the AVS if appropriate.

## 2024-01-20 ENCOUNTER — NURSE TRIAGE (OUTPATIENT)
Dept: CALL CENTER | Facility: HOSPITAL | Age: 44
End: 2024-01-20
Payer: COMMERCIAL

## 2024-01-21 NOTE — TELEPHONE ENCOUNTER
He has thrombosis of a hemorrhoid- Refereed to surgeon.It is viually outside of the body- it is flesh colored- Size marble     Reason for Disposition   [1] Home treatment > 3 days for rectal pain AND [2] not improved    Additional Information   Negative: Foreign body in rectum   Negative: Diarrhea is main symptom   Negative: Constipation is main symptom (e.g., pain or discomfort caused by passage of hard BMs)   Negative: Blood in or on bowel movement is main symptom   Negative: Pregnant   Negative: Pinworms are suspected (rectal itching; (white thread-like worm about size of a staple, moves)   Negative: [1] Mpox suspected (e.g., direct skin contact such as sex, recent travel to [place] or [place]) AND [2] symptoms of Mpox (e.g., rectal rash or sores, fever, muscle aches, or swollen lymph nodes)   Negative: [1] At risk for Mpox (men-who-have-sex-with-men) AND [2] possible exposure (e.g., multiple sex partners in past 21 days) AND [3] symptoms of Mpox (e.g., rectal rash or sores, fever, muscle aches, or swollen lymph nodes)   Negative: Sexual assault or rape (sexual intercourse or activity occurs without freely given consent), known or suspected   Negative: Injury to rectum   Negative: Large mass protruding out of rectum   Negative: Patient sounds very sick or weak to the triager   Negative: SEVERE rectal pain (e.g., excruciating, unable to have a bowel movement)   Negative: [1] Rectal pain or redness AND [2] fever   Negative: [1] Sudden onset rectal pain AND [2] constipated (straining, rectal pressure or fullness) AND [3] NOT better after SITZ bath, suppository or enema   Negative: MODERATE-SEVERE rectal pain (i.e., interferes with school, work, or sleep)   Negative: MODERATE-SEVERE rectal itching (i.e., interferes with school, work, or sleep)   Negative: Rash of rectal area (e.g., open sore, painful tiny water blisters, unexplained bumps)   Negative: Patient is worried they have a sexually transmitted infection  "(STI)   Negative: Rectal area looks infected (e.g., draining sore, spreading redness)   Negative: Last bowel movement (BM) > 4 days ago    Answer Assessment - Initial Assessment Questions  1. SYMPTOM:  \"What's the main symptom you're concerned about?\" (e.g., pain, itching, swelling, rash)      It is a swelling of a rectal area like hemorrhoid      2. ONSET: \"When did the *No Answer*  start?\"      Two days ago   3. RECTAL PAIN: \"Do you have any pain around your rectum?\" \"How bad is the pain?\"  (Scale 0-10; or mild, moderate, severe)    - NONE (0): no pain    - MILD (1-3): doesn't interfere with normal activities     - MODERATE (4-7): interferes with normal activities or awakens from sleep, limping     - SEVERE (8-10): excruciating pain, unable to have a bowel movement       No pain   4. RECTAL ITCHING: \"Do you have any itching in this area?\" \"How bad is the itching?\"  (Scale 0-10; or mild, moderate, severe)    - NONE: no itching    - MILD: doesn't interfere with normal activities     - MODERATE-SEVERE: interferes with normal activities or awakens from sleep      no  5. CONSTIPATION: \"Do you have constipation?\" If Yes, ask: \"How bad is it?\"      Yes   6. CAUSE: \"What do you think is causing the anus symptoms?\"     no  7. OTHER SYMPTOMS: \"Do you have any other symptoms?\"  (e.g., abdomen pain, fever, rectal bleeding, vomiting)      none  8. PREGNANCY: \"Is there any chance you are pregnant?\" \"When was your last menstrual period?\"      n    Protocols used: Rectal Symptoms-ADULT-[facility]    "

## 2024-01-21 NOTE — TELEPHONE ENCOUNTER
"Reason for Disposition   [1] Caller requesting NON-URGENT health information AND [2] PCP's office is the best resource    Additional Information   Negative: [1] Caller is not with the adult (patient) AND [2] reporting urgent symptoms   Negative: Lab result questions   Negative: Medication questions   Negative: Caller can't be reached by phone   Negative: Caller has already spoken to PCP or another triager   Negative: RN needs further essential information from caller in order to complete triage   Negative: Requesting regular office appointment    Answer Assessment - Initial Assessment Questions  1. REASON FOR CALL or QUESTION: \"What is your reason for calling today?\" or \"How can I best help you?\" or \"What question do you have that I can help answer?\"      States he called earlier and forgot to ask a question.  States he has been diagnosed with a hemorrhoid, has an appointment with general surgery 1/30, PCP prescribed suppositories for him to use until he can be seen by the surgeon.  Denies pain or bleeding; he founf on [employer] where it recommends having it drained within 72 hours.  His question is... can he wait until Jan 30 to be seen?  Advised him to keep the scheduled apmt with general surgery; if he develops pain or rectal bleeding to call his PCP to be seen in office.    Protocols used: Information Only Call-ADULT-    "

## 2024-01-23 ENCOUNTER — NURSE TRIAGE (OUTPATIENT)
Dept: CALL CENTER | Facility: HOSPITAL | Age: 44
End: 2024-01-23
Payer: COMMERCIAL

## 2024-01-23 ENCOUNTER — TELEPHONE (OUTPATIENT)
Dept: SURGERY | Facility: CLINIC | Age: 44
End: 2024-01-23
Payer: COMMERCIAL

## 2024-01-23 ENCOUNTER — TELEPHONE (OUTPATIENT)
Dept: FAMILY MEDICINE CLINIC | Facility: CLINIC | Age: 44
End: 2024-01-23

## 2024-01-23 NOTE — TELEPHONE ENCOUNTER
[facility] called patient was requesting sooner appointment - Patient rescheduled- I attempted to contact patient with new information- LEFT MESSAGE -asked patient to call with any questions-TH

## 2024-01-23 NOTE — TELEPHONE ENCOUNTER
Caller: Danny Ponce    Relationship: Self    Best call back number: [phone]     Who are you requesting to speak with (clinical staff, provider,  specific staff member): CLINICAL STAFF    What was the call regarding: PATIENT STATES HE HAS A FOLLOW UP QUESTION FROM HIS LAST APPT. HE IS REQUESTING A CALLBACK FROM THE NURSE.

## 2024-01-23 NOTE — TELEPHONE ENCOUNTER
Caller  was diagnosed several days ago by Dr. Kan with thrombosed hemorrhoid and set up with appointment with general surgery for evaluation and possible drainage on 01/31/2024. Continues to c/o mild to moderate pain with treatment recommended per Dr. Kan. Requesting to see if appointment with general surgery can be sooner.    Call placed to office of JAMIE Sol with whom caller has appointment, Spoke with staff and informed of patient request and symptoms. States will call patient and can move appointment up to 01/25/2024 at 1530.    Caller notified that office will be calling and that appointment for 01/25/2024 is available. Verbalized understanding. Encouraged to call back for any worsening symptoms or concerns.    Reason for Disposition   Home treatment > 3 days for rectal pain and not improved    Additional Information   Negative: Sounds like a life-threatening emergency to the triager   Negative: Diarrhea is main symptom   Negative: Constipation is main symptom (e.g., pain or discomfort caused by passage of hard BMs)   Negative: Blood in or on bowel movement is main symptom   Negative: MPOX SUSPECTED (e.g., direct skin contact such as sex, recent travel to [place]) and any SYMPTOMS OF MPOX (e.g., rash, fever, muscle aches, or swollen lymph nodes)   Negative: At risk for Mpox (men-who-have-sex-with-men) and possible exposure (e.g., multiple sex partners in past 21 days) and ANY SYMPTOMS OF MPOX (e.g., rash, fever, muscle aches, or swollen lymph nodes)   Negative: Sexual assault or rape (sexual intercourse or activity occurs without freely given consent), known or suspected   Negative: Injury to rectum   Negative: Patient sounds very sick or weak to the triager   Negative: Severe rectal pain   Negative: Rectal pain or redness and fever > 100.4 F (38.0 C)   Negative: Acute onset rectal pain and constipation (straining with rectal pressure or fullness), which is not relieved by Sitz  bath or suppository   Negative: MODERATE-SEVERE rectal pain (i.e., interferes with school, work, or sleep)   Negative: MODERATE-SEVERE rectal itching (i.e., interferes with school, work, or sleep)   Negative: Last bowel movement (BM) > 4 days ago   Negative: Rectal area looks infected (e.g., draining sore, spreading redness)   Negative: Rash of rectal area (e.g., open sore, painful tiny water blisters, unexplained bumps)   Negative: Patient is worried they have a sexually transmitted infection (STI)    Protocols used: Rectal Symptoms-ADULT-[place]

## 2024-01-24 NOTE — TELEPHONE ENCOUNTER
Contacted pt he is concerned with what he has been reading on the intranet, I called and spoke to Gen Surg they normally wont do this in office but there is a surgeon available if they feel the need to do this in office, they evaluate first, I called pt back and left the information on his vm since I will be out of the office the rest of the day

## 2024-01-25 ENCOUNTER — OFFICE VISIT (OUTPATIENT)
Dept: SURGERY | Facility: CLINIC | Age: 44
End: 2024-01-25
Payer: COMMERCIAL

## 2024-01-25 VITALS
WEIGHT: 224 LBS | HEART RATE: 83 BPM | BODY MASS INDEX: 31.36 KG/M2 | HEIGHT: 71 IN | OXYGEN SATURATION: 98 % | DIASTOLIC BLOOD PRESSURE: 79 MMHG | SYSTOLIC BLOOD PRESSURE: 117 MMHG

## 2024-01-25 DIAGNOSIS — K64.5 EXTERNAL THROMBOSED HEMORRHOIDS: Primary | ICD-10-CM

## 2024-01-25 NOTE — PROGRESS NOTES
"Office New Patient History and Physical:     Referring Provider: Jeremy Kan MD    No chief complaint on file.      Subjective .     History of present illness:  Danny Ponce is a 43 y.o. male who presents to the clinic with an external thrombosed hemorrhoid after being seen by Dr. Kan in office last week. The patient reports that he has experienced a few months of \"the area back there feeling odd\". He also states that a week before he went to see Dr. Kan, that \"it felt more different.\" He went to see Dr. Kan on Thursday, 1/18/24 due feeling a \"marble-sized\" abnormality while wiping. He states that since that visit, it has felt the same. He denies any pain in the area, but states it feels more like pressure. He denies any blood per rectum in stool, in toilet bowl, or on tissue when wiping. The patient has a history of hemorrhoids, years ago, but states that this felt different. He denies feeling constipated or feeling like he is having to strain during bowel movements. Dr. Kan prescribed suppositories, which he has been using. He also reports using castor oil, preparation H wipes, and taking epson salt baths. He states that these have not really helped.     BMI is 31.26. He is a nonsmoker. He does not take any blood thinners, other than intermittently taking 81mg ASA.     History  Past Medical History:   Diagnosis Date    Headache     Hypertension 12/6/18    Neck pain    ,   Past Surgical History:   Procedure Laterality Date    COLONOSCOPY N/A 3/23/2018    Procedure: COLONOSCOPY WITH ANESTHESIA;  Surgeon: Tomas Mendez MD;  Location: [facility] ENDOSCOPY;  Service: Gastroenterology    NO PAST SURGERIES     ,   Family History   Problem Relation Age of Onset    Diabetes Mother     Stroke Mother     Clotting disorder Mother     Irregular heart beat Mother     Pulmonary embolism Mother     Stomach cancer Maternal Grandfather     Irregular heart beat Father     Heart disease Father     Alcohol abuse Paternal " "Grandfather     Colon cancer Neg Hx     Colon polyps Neg Hx    ,   Social History     Tobacco Use    Smoking status: Former    Smokeless tobacco: Never   Vaping Use    Vaping Use: Never used   Substance Use Topics    Alcohol use: Yes     Alcohol/week: 2.0 standard drinks of alcohol     Types: 2 Cans of beer per week     Comment: occ    Drug use: No   , (Not in a hospital admission)   and Allergies:  Patient has no known allergies.    Current Outpatient Medications:     Ped Multivitamins-Fl-Iron (Multi-Vitamin/Fluoride/Iron) 0.25-10 MG/ML solution, Take  by mouth., Disp: , Rfl:     Objective     Vital Signs   /79   Pulse 83   Ht 180.3 cm (70.98\")   Wt 102 kg (224 lb)   SpO2 98%   BMI 31.26 kg/m²      Physical Exam:  General appearance - alert, well appearing, and in no distress and oriented to person, place, and time  Mental status - alert, oriented to person, place, and time, normal mood, behavior, speech, dress, motor activity, and thought processes  Eyes - pupils equal and reactive, extraocular eye movements intact, sclera anicteric  Neck - supple, no significant adenopathy  Chest - no tachypnea, retractions or cyanosis  Heart - normal rate and regular rhythm  Rectal - 2 cm thrombosed external hemorrhoid at 2 o'clock  Neurological - alert, oriented, normal speech, no focal findings or movement disorder noted  Skin - normal coloration and turgor, no rashes, no suspicious skin lesions noted    Results Review:  Result Review :            Progress Notes by Jeremy Kan MD (01/18/2024 08:45)   1. Thrombosed external hemorrhoid (Primary)  -D/w pt seeing general surgery vs monitoring given relatively low symptom level. Recommended good hydration, fiber supplementation, avoiding prolonged sitting/straining  -     Ambulatory Referral to General Surgery    Assessment & Plan       Diagnoses and all orders for this visit:    1. External thrombosed hemorrhoids (Primary)     Mr. Ponce is a 42 y/o male who " presents to the clinic with a thrombosed hemorrhoid. After a discussion with him, I consented him for incision & evacuation of this. The area was prepped with iodine. 1mL of lidocaine was drawn into a syringe. 0.7mLs was used to infiltrate the area. A 15 blade scalpel was then used to make a 1 cm incision. Blood as well as multiple blood clots were evacuated from the area. The area was irrigated with 10ccs of sterile saline. The patient tolerated the procedure well.     I instructed him to dry and keep the area clean and dry and to expect continued drainage of the blood from the area. However, this should improve over the next few days. I also advised him to take tylenol and ibuprofen if he experiences any pain in the area. He voiced understanding of this. He will call for an appointment if he has worsening symptoms before his follow up appointment next week. He is agreeable to this plan.     This is a chronic problem with progression. I have reviewed previous notes and also completed an I&D in office today.     Follow up:           Return in 1 week (on 2/1/2024) for Recheck.        Rosalie Lisa PA-C  01/25/24  16:16 CST

## 2024-04-16 ENCOUNTER — LAB (OUTPATIENT)
Dept: LAB | Facility: HOSPITAL | Age: 44
End: 2024-04-16
Payer: COMMERCIAL

## 2024-04-16 ENCOUNTER — OFFICE VISIT (OUTPATIENT)
Dept: FAMILY MEDICINE CLINIC | Facility: CLINIC | Age: 44
End: 2024-04-16
Payer: COMMERCIAL

## 2024-04-16 VITALS
HEART RATE: 83 BPM | WEIGHT: 236 LBS | DIASTOLIC BLOOD PRESSURE: 77 MMHG | TEMPERATURE: 97.9 F | SYSTOLIC BLOOD PRESSURE: 124 MMHG | BODY MASS INDEX: 33.04 KG/M2 | RESPIRATION RATE: 20 BRPM | HEIGHT: 71 IN
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
## 2024-04-16 DIAGNOSIS — S80.861A TICK BITE OF RIGHT LOWER LEG, INITIAL ENCOUNTER: ICD-10-CM

## 2024-04-16 DIAGNOSIS — S80.861A TICK BITE OF RIGHT LOWER LEG, INITIAL ENCOUNTER: Primary | ICD-10-CM

## 2024-04-16 DIAGNOSIS — E66.9 OBESITY (BMI 30.0-34.9): ICD-10-CM
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
## 2024-04-16 LAB
ALBUMIN SERPL-MCNC: 4 G/DL (ref 3.5–5)
ALBUMIN/GLOB SERPL: 1.3 G/DL (ref 1.1–2.5)
ALP SERPL-CCNC: 72 U/L (ref 24–120)
ALT SERPL W P-5'-P-CCNC: 36 U/L (ref 0–50)
ANION GAP SERPL CALCULATED.3IONS-SCNC: 6 MMOL/L (ref 4–13)
AST SERPL-CCNC: 30 U/L (ref 7–45)
AUTO MIXED CELLS #: 0.4 10*3/MM3 (ref 0.1–2.6)
AUTO MIXED CELLS %: 6.1 % (ref 0.1–24)
BILIRUB SERPL-MCNC: 1.1 MG/DL (ref 0.1–1)
BILIRUB UR QL STRIP: NEGATIVE
BUN SERPL-MCNC: 15 MG/DL (ref 5–21)
BUN/CREAT SERPL: 16.3
CALCIUM SPEC-SCNC: 9.3 MG/DL (ref 8.4–10.4)
CHLORIDE SERPL-SCNC: 101 MMOL/L (ref 98–110)
CLARITY UR: CLEAR
CO2 SERPL-SCNC: 31 MMOL/L (ref 24–31)
COLOR UR: YELLOW
CREAT SERPL-MCNC: 0.92 MG/DL (ref 0.5–1.4)
EGFRCR SERPLBLD CKD-EPI 2021: 105.8 ML/MIN/1.73
ERYTHROCYTE [DISTWIDTH] IN BLOOD BY AUTOMATED COUNT: 15.1 % (ref 12.3–15.4)
GLOBULIN UR ELPH-MCNC: 3 GM/DL
GLUCOSE SERPL-MCNC: 80 MG/DL (ref 70–100)
GLUCOSE UR STRIP-MCNC: NEGATIVE MG/DL
HCT VFR BLD AUTO: 44.2 % (ref 37.5–51)
HGB BLD-MCNC: 15.5 G/DL (ref 13–17.7)
HGB UR QL STRIP.AUTO: NEGATIVE
KETONES UR QL STRIP: NEGATIVE
LEUKOCYTE ESTERASE UR QL STRIP.AUTO: NEGATIVE
LYMPHOCYTES # BLD AUTO: 2.5 10*3/MM3 (ref 0.7–3.1)
LYMPHOCYTES NFR BLD AUTO: 40.6 % (ref 19.6–45.3)
MCH RBC QN AUTO: 31.1 PG (ref 26.6–33)
MCHC RBC AUTO-ENTMCNC: 35.1 G/DL (ref 31.5–35.7)
MCV RBC AUTO: 88.8 FL (ref 79–97)
NEUTROPHILS NFR BLD AUTO: 3.3 10*3/MM3 (ref 1.7–7)
NEUTROPHILS NFR BLD AUTO: 53.3 % (ref 42.7–76)
NITRITE UR QL STRIP: NEGATIVE
PH UR STRIP.AUTO: 6 [PH] (ref 5–8)
PLATELET # BLD AUTO: 201 10*3/MM3 (ref 140–450)
PMV BLD AUTO: 9.5 FL (ref 6–12)
POTASSIUM SERPL-SCNC: 4.3 MMOL/L (ref 3.5–5.3)
PROT SERPL-MCNC: 7 G/DL (ref 6.3–8.7)
PROT UR QL STRIP: NEGATIVE
RBC # BLD AUTO: 4.98 10*6/MM3 (ref 4.14–5.8)
SODIUM SERPL-SCNC: 138 MMOL/L (ref 135–145)
SP GR UR STRIP: 1.02 (ref 1–1.03)
UROBILINOGEN UR QL STRIP: NORMAL
WBC NRBC COR # BLD AUTO: 6.2 10*3/MM3 (ref 3.4–10.8)

## 2024-04-16 PROCEDURE — 87798 DETECT AGENT NOS DNA AMP: CPT

## 2024-04-16 PROCEDURE — 85025 COMPLETE CBC W/AUTO DIFF WBC: CPT

## 2024-04-16 PROCEDURE — 36415 COLL VENOUS BLD VENIPUNCTURE: CPT

## 2024-04-16 PROCEDURE — 80053 COMPREHEN METABOLIC PANEL: CPT

## 2024-04-16 PROCEDURE — 86666 EHRLICHIA ANTIBODY: CPT

## 2024-04-16 PROCEDURE — 86618 LYME DISEASE ANTIBODY: CPT

## 2024-04-16 PROCEDURE — 81003 URINALYSIS AUTO W/O SCOPE: CPT

## 2024-04-16 RX ORDER — DOXYCYCLINE HYCLATE 100 MG/1
100 CAPSULE ORAL 2 TIMES DAILY
Qty: 20 CAPSULE | Refills: 0 | Status: SHIPPED | OUTPATIENT
Start: 2024-04-16

## 2024-04-16 NOTE — PROGRESS NOTES
"Chief Complaint  insect bite and tick bites     Subjective    History of Present Illness      Patient presents to [facility] for   History of Present Illness  Pt is here today c/o of a tick bite on his right shin.  Pt states he was able to remove the tick last Friday but it's been red and irritated since then.  Pt also experiencing  fatigue and neck pain/stiffness.       Review of Systems   Constitutional: Negative.    HENT: Negative.     Eyes: Negative.    Respiratory: Negative.     Cardiovascular: Negative.    Gastrointestinal: Negative.    Endocrine: Negative.    Genitourinary: Negative.    Musculoskeletal: Negative.    Skin:  Positive for skin lesions.        Tick bite to right shin   Allergic/Immunologic: Negative.    Neurological: Negative.    Hematological: Negative.    Psychiatric/Behavioral: Negative.         I have reviewed and agree with the HPI and ROS information as above.  Cordelia Sullivan, APRN     Objective   Vital Signs:   /77   Pulse 83   Temp 97.9 °F (36.6 °C)   Resp 20   Ht 180.3 cm (70.98\")   Wt 107 kg (236 lb)   BMI 32.93 kg/m²            Physical Exam  Constitutional:       Appearance: Normal appearance. He is well-developed. He is obese.   HENT:      Head: Normocephalic and atraumatic.      Right Ear: External ear normal.      Left Ear: External ear normal.      Nose: Nose normal. No nasal tenderness or congestion.      Mouth/Throat:      Lips: Pink. No lesions.      Mouth: Mucous membranes are moist. No oral lesions.      Dentition: Normal dentition.      Pharynx: Oropharynx is clear. No pharyngeal swelling, oropharyngeal exudate or posterior oropharyngeal erythema.   Eyes:      General: Lids are normal. Vision grossly intact. No scleral icterus.        Right eye: No discharge.         Left eye: No discharge.      Extraocular Movements: Extraocular movements intact.      Conjunctiva/sclera: Conjunctivae normal.      Right eye: Right conjunctiva is " not injected.      Left eye: Left conjunctiva is not injected.      Pupils: Pupils are equal, round, and reactive to light.   Cardiovascular:      Rate and Rhythm: Normal rate and regular rhythm.      Heart sounds: Normal heart sounds. No murmur heard.     No gallop.   Pulmonary:      Effort: Pulmonary effort is normal.      Breath sounds: Normal breath sounds and air entry. No wheezing, rhonchi or rales.   Musculoskeletal:         General: No tenderness or deformity. Normal range of motion.      Cervical back: Full passive range of motion without pain, normal range of motion and neck supple.      Right lower leg: No edema.      Left lower leg: No edema.   Skin:     General: Skin is warm and dry.      Coloration: Skin is not jaundiced.      Findings: No rash.      Comments: Slight redness to right shin at tick bite site   Neurological:      Mental Status: He is alert and oriented to person, place, and time.      Sensory: Sensation is intact.      Motor: Motor function is intact.      Coordination: Coordination is intact.      Gait: Gait is intact.   Psychiatric:         Attention and Perception: Attention normal.         Mood and Affect: Mood and affect normal.         Behavior: Behavior is not hyperactive. Behavior is cooperative.         Thought Content: Thought content normal.         Judgment: Judgment normal.          BERNA-7:      PHQ-2 Depression Screening  Little interest or pleasure in doing things?     Feeling down, depressed, or hopeless?     PHQ-2 Total Score       PHQ-9 Depression Screening  Little interest or pleasure in doing things?     Feeling down, depressed, or hopeless?     Trouble falling or staying asleep, or sleeping too much?     Feeling tired or having little energy?     Poor appetite or overeating?     Feeling bad about yourself - or that you are a failure or have let yourself or your family down?     Trouble concentrating on things, such as reading the newspaper or watching television?      Moving or speaking so slowly that other people could have noticed? Or the opposite - being so fidgety or restless that you have been moving around a lot more than usual?     Thoughts that you would be better off dead, or of hurting yourself in some way?     PHQ-9 Total Score     If you checked off any problems, how difficult have these problems made it for you to do your work, take care of things at home, or get along with other people?        Result Review  Data Reviewed:                Assessment and Plan      Diagnoses and all orders for this visit:    1. Tick bite of right lower leg, initial encounter (Primary)  -     doxycycline (VIBRAMYCIN) 100 MG capsule; Take 1 capsule by mouth 2 (Two) Times a Day.  Dispense: 20 capsule; Refill: 0  -     CBC Auto Differential; Future  -     Comprehensive Metabolic Panel; Future  -     Urinalysis With Culture If Indicated - Urine, Clean Catch; Future  -     Lyme Disease Total Antibody With Reflex to Immunoassay; Future  -     Ehrlichia Antibody Panel; Future  -     Rickettsia Species DNA, Real-Time PCR; Future    2. Obesity (BMI 30.0-34.9)      Patient here today with reports of a tick bite his right shin.  He noticed the tick on Friday and was able to easily remove the tick.  He reports redness to his right shin since then.  He also states he has had some fatigue as well as neck and back stiffness.  He is not sure if his fatigue is related to being up early hunting over the weekend.  On exam there is slight redness at the tick bite site which looks reactive and a normal response.    Plan:    1.  Start doxycycline 100 mg twice daily x 10 days.  2.  Take labs ordered.  3.  Follow-up if symptoms do not improve or become worse.        Follow Up   Return if symptoms worsen or fail to improve.  Patient was given instructions and counseling regarding his condition or for health maintenance advice. Please see specific information pulled into the AVS if appropriate.

## 2024-04-17 LAB — B BURGDOR IGG+IGM SER QL IA: NEGATIVE

## 2024-04-18 LAB
A PHAGOCYTOPH IGG TITR SER IF: NEGATIVE {TITER}
A PHAGOCYTOPH IGM TITR SER IF: NEGATIVE {TITER}
E CHAFFEENSIS IGG TITR SER IF: NEGATIVE {TITER}
E CHAFFEENSIS IGM TITR SER IF: NEGATIVE {TITER}
RESULT COMMENT:: NORMAL

## 2024-04-20 LAB — RICKETTSIA RICKETTSII DNA, RT: NOT DETECTED

## 2024-04-22 ENCOUNTER — TELEPHONE (OUTPATIENT)
Dept: FAMILY MEDICINE CLINIC | Facility: CLINIC | Age: 44
End: 2024-04-22
Payer: COMMERCIAL

## 2024-04-22 NOTE — TELEPHONE ENCOUNTER
----- Message from JOSSY Du sent at 4/22/2024  7:31 AM CDT -----  CBC- wnl  CMP- stable  Urinalysis- clear  Tick labs- negative

## 2024-04-26 ENCOUNTER — OFFICE VISIT (OUTPATIENT)
Dept: FAMILY MEDICINE CLINIC | Facility: CLINIC | Age: 44
End: 2024-04-26
Payer: COMMERCIAL

## 2024-04-26 VITALS
HEIGHT: 71 IN | RESPIRATION RATE: 20 BRPM | DIASTOLIC BLOOD PRESSURE: 72 MMHG | SYSTOLIC BLOOD PRESSURE: 120 MMHG | OXYGEN SATURATION: 98 % | HEART RATE: 81 BPM | WEIGHT: 231 LBS | TEMPERATURE: 97.7 F | BODY MASS INDEX: 32.34 KG/M2

## 2024-04-26 DIAGNOSIS — M79.5 FOREIGN BODY (FB) IN SOFT TISSUE: Primary | ICD-10-CM

## 2024-04-26 DIAGNOSIS — Z23 NEED FOR TDAP VACCINATION: ICD-10-CM

## 2024-04-26 RX ORDER — CEPHALEXIN 500 MG/1
500 CAPSULE ORAL 2 TIMES DAILY
Qty: 14 CAPSULE | Refills: 0 | Status: SHIPPED | OUTPATIENT
Start: 2024-04-26 | End: 2024-05-03

## 2024-04-26 NOTE — PROGRESS NOTES
"       Chief Complaint  stick sliver    Subjective        Danny Ponce presents to [facility]    HPI    Foreign body  -Was outside doing yard/field work when he got bush limb/stick stuck in R thigh. He pulled out fragment but is concerned there is piece still inside as he's having sharp pain at times in the leg. No fever, erythema, discharge or other consitutional sx.    Past Medical History:   Diagnosis Date    Headache     Hypertension 12/6/18    Neck pain      Past Surgical History:   Procedure Laterality Date    COLONOSCOPY N/A 3/23/2018    Procedure: COLONOSCOPY WITH ANESTHESIA;  Surgeon: Tomas Mendez MD;  Location: [facility] ENDOSCOPY;  Service: Gastroenterology    NO PAST SURGERIES       Social History     Socioeconomic History    Marital status:    Tobacco Use    Smoking status: Former     Current packs/day: 1.00     Types: Cigarettes     Passive exposure: Past    Smokeless tobacco: Never   Vaping Use    Vaping status: Never Used   Substance and Sexual Activity    Alcohol use: Yes     Alcohol/week: 2.0 standard drinks of alcohol     Types: 2 Cans of beer per week     Comment: occ    Drug use: No    Sexual activity: Yes     Partners: Female     Birth control/protection: Condom       Objective   Vital Signs:  /72   Pulse 81   Temp 97.7 °F (36.5 °C) (Temporal)   Resp 20   Ht 180.3 cm (70.98\")   Wt 105 kg (231 lb)   SpO2 98%   BMI 32.23 kg/m²   Estimated body mass index is 32.23 kg/m² as calculated from the following:    Height as of this encounter: 180.3 cm (70.98\").    Weight as of this encounter: 105 kg (231 lb).              Physical Exam  Vitals reviewed.   Constitutional:       Appearance: Normal appearance.   HENT:      Head: Normocephalic.      Nose: Nose normal.      Mouth/Throat:      Mouth: Mucous membranes are moist.   Eyes:      Extraocular Movements: Extraocular movements intact.   Cardiovascular:      Rate and Rhythm: Normal rate and " regular rhythm.      Heart sounds: Normal heart sounds.   Pulmonary:      Effort: Pulmonary effort is normal.      Breath sounds: Normal breath sounds.   Musculoskeletal:         General: Normal range of motion.      Cervical back: Normal range of motion.        Legs:       Comments: R lateral thigh small puncture wound, closed. When squeezing skin in opposing manner from proximal to distal there is pinpoint pain just inferior to wound however I do not palpate foreign body under skin.   Skin:     General: Skin is warm and dry.   Neurological:      General: No focal deficit present.      Mental Status: He is alert and oriented to person, place, and time.   Psychiatric:         Mood and Affect: Mood normal.         Behavior: Behavior normal.        Result Review :                   Assessment and Plan   Diagnoses and all orders for this visit:    1. Foreign body (FB) in soft tissue (Primary)  -Recommend observation at the moment. There may be small, thin fragment retained however at this time does not feel palpable enough to remove. Update tetanus and cover w/ abx to prevent infection. FU 1 week.  -     cephalexin (Keflex) 500 MG capsule; Take 1 capsule by mouth 2 (Two) Times a Day for 7 days.  Dispense: 14 capsule; Refill: 0  -     Tdap Vaccine => 8yo IM (BOOSTRIX)             EMR Dragon/Transcription disclaimer:   Much of this encounter note is an electronic transcription/translation of spoken language to printed text. The electronic translation of spoken language may permit erroneous, or at times, nonsensical words or phrases to be inadvertently transcribed; although attempts have made to review the note for such errors, some may still exist. Please excuse any unrecognized transcription errors and contact us if the air is unintelligible or needs documented correction. Also, portions of this note have been copied forward, however, changed to reflect the most current clinical status of this patient.  Follow Up   No  follow-ups on file.  Patient was given instructions and counseling regarding his condition or for health maintenance advice. Please see specific information pulled into the AVS if appropriate.

## 2024-05-03 ENCOUNTER — OFFICE VISIT (OUTPATIENT)
Dept: FAMILY MEDICINE CLINIC | Facility: CLINIC | Age: 44
End: 2024-05-03
Payer: COMMERCIAL

## 2024-05-03 VITALS
SYSTOLIC BLOOD PRESSURE: 126 MMHG | DIASTOLIC BLOOD PRESSURE: 76 MMHG | HEIGHT: 71 IN | BODY MASS INDEX: 32.76 KG/M2 | HEART RATE: 74 BPM | WEIGHT: 234 LBS | TEMPERATURE: 98.7 F | OXYGEN SATURATION: 98 % | RESPIRATION RATE: 20 BRPM

## 2024-05-03 DIAGNOSIS — M79.5 FOREIGN BODY (FB) IN SOFT TISSUE: Primary | ICD-10-CM

## 2024-05-03 PROCEDURE — 99213 OFFICE O/P EST LOW 20 MIN: CPT | Performed by: STUDENT IN AN ORGANIZED HEALTH CARE EDUCATION/TRAINING PROGRAM

## 2024-05-03 NOTE — PROGRESS NOTES
"       Chief Complaint  Foreign Body    Subjective        Danny Ponce presents to [facility]    HPI    Foreign body  -Pt seen previously for likely retained fragment of stick/limb in R leg/thigh. I recommended observation given appearance. He was given TDAP and covered w/ abx. Today he returns. Says discomfort still present but not quite as noticeable. No other changes.     Past Medical History:   Diagnosis Date    Headache     Hypertension 12/6/18    Neck pain      Past Surgical History:   Procedure Laterality Date    COLONOSCOPY N/A 3/23/2018    Procedure: COLONOSCOPY WITH ANESTHESIA;  Surgeon: Tomas Mendez MD;  Location: [facility] END[facility];  Service: Gastroenterology    NO PAST SURGERIES       Social History     Socioeconomic History    Marital status:    Tobacco Use    Smoking status: Former     Current packs/day: 1.00     Types: Cigarettes     Passive exposure: Past    Smokeless tobacco: Never   Vaping Use    Vaping status: Never Used   Substance and Sexual Activity    Alcohol use: Yes     Alcohol/week: 2.0 standard drinks of alcohol     Types: 2 Cans of beer per week     Comment: occ    Drug use: No    Sexual activity: Yes     Partners: Female     Birth control/protection: Condom       Objective   Vital Signs:  /76   Pulse 74   Temp 98.7 °F (37.1 °C) (Temporal)   Resp 20   Ht 180.3 cm (70.98\")   Wt 106 kg (234 lb)   SpO2 98%   BMI 32.65 kg/m²   Estimated body mass index is 32.65 kg/m² as calculated from the following:    Height as of this encounter: 180.3 cm (70.98\").    Weight as of this encounter: 106 kg (234 lb).              Physical Exam  Vitals reviewed.   Constitutional:       Appearance: Normal appearance.   HENT:      Head: Normocephalic.      Nose: Nose normal.      Mouth/Throat:      Mouth: Mucous membranes are moist.   Eyes:      Extraocular Movements: Extraocular movements intact.   Cardiovascular:      Rate and Rhythm: Normal rate and " regular rhythm.      Heart sounds: Normal heart sounds.   Pulmonary:      Effort: Pulmonary effort is normal.      Breath sounds: Normal breath sounds.   Musculoskeletal:         General: Normal range of motion.      Cervical back: Normal range of motion.   Skin:     General: Skin is warm and dry.             Comments: Healed puncture wound. Foreign body not clearly palpated however there is slight blanching of skin just above wound. Previously this was appreciated below wound   Neurological:      General: No focal deficit present.      Mental Status: He is alert and oriented to person, place, and time.   Psychiatric:         Mood and Affect: Mood normal.         Behavior: Behavior normal.        Result Review :                   Assessment and Plan   Diagnoses and all orders for this visit:    1. Foreign body (FB) in soft tissue (Primary)  -Recommend continued observation. Appears may be trying to work itself out and may be favorable to allow more time. If still significantly symptomatic in 4 weeks would recommend pt see general surgery to assist w/ removal.           EMR Dragon/Transcription disclaimer:   Much of this encounter note is an electronic transcription/translation of spoken language to printed text. The electronic translation of spoken language may permit erroneous, or at times, nonsensical words or phrases to be inadvertently transcribed; although attempts have made to review the note for such errors, some may still exist. Please excuse any unrecognized transcription errors and contact us if the air is unintelligible or needs documented correction. Also, portions of this note have been copied forward, however, changed to reflect the most current clinical status of this patient.  Follow Up   Return in about 6 months (around 11/3/2024).  Patient was given instructions and counseling regarding his condition or for health maintenance advice. Please see specific information pulled into the AVS if  appropriate.

## 2024-07-31 ENCOUNTER — OFFICE VISIT (OUTPATIENT)
Dept: FAMILY MEDICINE CLINIC | Facility: CLINIC | Age: 44
End: 2024-07-31
Payer: COMMERCIAL

## 2024-07-31 ENCOUNTER — LAB (OUTPATIENT)
Dept: LAB | Facility: HOSPITAL | Age: 44
End: 2024-07-31
Payer: COMMERCIAL

## 2024-07-31 VITALS
WEIGHT: 228 LBS | OXYGEN SATURATION: 96 % | DIASTOLIC BLOOD PRESSURE: 80 MMHG | HEIGHT: 71 IN | TEMPERATURE: 97.1 F | BODY MASS INDEX: 31.92 KG/M2 | SYSTOLIC BLOOD PRESSURE: 130 MMHG | HEART RATE: 121 BPM | RESPIRATION RATE: 18 BRPM

## 2024-07-31 DIAGNOSIS — R00.0 TACHYCARDIA: Primary | ICD-10-CM

## 2024-07-31 DIAGNOSIS — F41.9 ANXIETY: ICD-10-CM

## 2024-07-31 DIAGNOSIS — M54.2 NECK PAIN: ICD-10-CM

## 2024-07-31 DIAGNOSIS — R00.0 TACHYCARDIA: ICD-10-CM

## 2024-07-31 LAB
ALBUMIN SERPL-MCNC: 4.3 G/DL (ref 3.5–5.2)
ALBUMIN/GLOB SERPL: 1.5 G/DL
ALP SERPL-CCNC: 85 U/L (ref 39–117)
ALT SERPL W P-5'-P-CCNC: 30 U/L (ref 1–41)
ANION GAP SERPL CALCULATED.3IONS-SCNC: 10 MMOL/L (ref 5–15)
AST SERPL-CCNC: 24 U/L (ref 1–40)
BILIRUB SERPL-MCNC: 1.5 MG/DL (ref 0–1.2)
BUN SERPL-MCNC: 16 MG/DL (ref 6–20)
BUN/CREAT SERPL: 15.2 (ref 7–25)
CALCIUM SPEC-SCNC: 9.3 MG/DL (ref 8.6–10.5)
CHLORIDE SERPL-SCNC: 101 MMOL/L (ref 98–107)
CO2 SERPL-SCNC: 27 MMOL/L (ref 22–29)
CREAT SERPL-MCNC: 1.05 MG/DL (ref 0.76–1.27)
D DIMER PPP FEU-MCNC: 0.84 MCGFEU/ML (ref 0–0.5)
DEPRECATED RDW RBC AUTO: 39.3 FL (ref 37–54)
EGFRCR SERPLBLD CKD-EPI 2021: 89.8 ML/MIN/1.73
ERYTHROCYTE [DISTWIDTH] IN BLOOD BY AUTOMATED COUNT: 13 % (ref 12.3–15.4)
GLOBULIN UR ELPH-MCNC: 2.9 GM/DL
GLUCOSE SERPL-MCNC: 124 MG/DL (ref 65–99)
HCT VFR BLD AUTO: 50 % (ref 37.5–51)
HGB BLD-MCNC: 17.5 G/DL (ref 13–17.7)
MCH RBC QN AUTO: 31.4 PG (ref 26.6–33)
MCHC RBC AUTO-ENTMCNC: 35 G/DL (ref 31.5–35.7)
MCV RBC AUTO: 89.6 FL (ref 79–97)
PLATELET # BLD AUTO: 204 10*3/MM3 (ref 140–450)
PMV BLD AUTO: 10.6 FL (ref 6–12)
POTASSIUM SERPL-SCNC: 4 MMOL/L (ref 3.5–5.2)
PROT SERPL-MCNC: 7.2 G/DL (ref 6–8.5)
RBC # BLD AUTO: 5.58 10*6/MM3 (ref 4.14–5.8)
SODIUM SERPL-SCNC: 138 MMOL/L (ref 136–145)
WBC NRBC COR # BLD AUTO: 8.62 10*3/MM3 (ref 3.4–10.8)

## 2024-07-31 PROCEDURE — 36415 COLL VENOUS BLD VENIPUNCTURE: CPT

## 2024-07-31 PROCEDURE — 85027 COMPLETE CBC AUTOMATED: CPT

## 2024-07-31 PROCEDURE — 80053 COMPREHEN METABOLIC PANEL: CPT

## 2024-07-31 PROCEDURE — 99214 OFFICE O/P EST MOD 30 MIN: CPT | Performed by: FAMILY MEDICINE

## 2024-07-31 PROCEDURE — 85379 FIBRIN DEGRADATION QUANT: CPT

## 2024-07-31 RX ORDER — MULTIPLE VITAMINS W/ MINERALS TAB 9MG-400MCG
1 TAB ORAL DAILY
COMMUNITY

## 2024-07-31 RX ORDER — ALPRAZOLAM 0.5 MG/1
0.5 TABLET ORAL 2 TIMES DAILY PRN
Qty: 7 TABLET | Refills: 0 | Status: SHIPPED | OUTPATIENT
Start: 2024-07-31

## 2024-08-01 ENCOUNTER — TELEPHONE (OUTPATIENT)
Dept: FAMILY MEDICINE CLINIC | Facility: CLINIC | Age: 44
End: 2024-08-01
Payer: COMMERCIAL

## 2024-08-01 ENCOUNTER — HOSPITAL ENCOUNTER (EMERGENCY)
Facility: HOSPITAL | Age: 44
Discharge: HOME | End: 2024-08-01
Payer: COMMERCIAL

## 2024-08-01 ENCOUNTER — APPOINTMENT (OUTPATIENT)
Dept: CT IMAGING | Facility: HOSPITAL | Age: 44
End: 2024-08-01
Payer: COMMERCIAL

## 2024-08-01 VITALS
SYSTOLIC BLOOD PRESSURE: 128 MMHG | DIASTOLIC BLOOD PRESSURE: 77 MMHG | WEIGHT: 227.07 LBS | HEART RATE: 88 BPM | HEIGHT: 71 IN | BODY MASS INDEX: 31.79 KG/M2 | RESPIRATION RATE: 16 BRPM | OXYGEN SATURATION: 100 % | TEMPERATURE: 98.5 F

## 2024-08-01 DIAGNOSIS — Z86.59 HISTORY OF ANXIETY: ICD-10-CM

## 2024-08-01 DIAGNOSIS — I26.94 MULTIPLE SUBSEGMENTAL PULMONARY EMBOLI WITHOUT ACUTE COR PULMONALE: Primary | ICD-10-CM

## 2024-08-01 DIAGNOSIS — R53.83 OTHER FATIGUE: ICD-10-CM

## 2024-08-01 LAB
ALBUMIN SERPL-MCNC: 4.3 G/DL (ref 3.5–5.2)
ALBUMIN/GLOB SERPL: 1.7 G/DL
ALP SERPL-CCNC: 84 U/L (ref 39–117)
ALT SERPL W P-5'-P-CCNC: 32 U/L (ref 1–41)
ANION GAP SERPL CALCULATED.3IONS-SCNC: 8 MMOL/L (ref 5–15)
AST SERPL-CCNC: 26 U/L (ref 1–40)
BASOPHILS # BLD AUTO: 0.03 10*3/MM3 (ref 0–0.2)
BASOPHILS NFR BLD AUTO: 0.6 % (ref 0–1.5)
BILIRUB SERPL-MCNC: 1.1 MG/DL (ref 0–1.2)
BUN SERPL-MCNC: 15 MG/DL (ref 6–20)
BUN/CREAT SERPL: 16.9 (ref 7–25)
CALCIUM SPEC-SCNC: 8.9 MG/DL (ref 8.6–10.5)
CHLORIDE SERPL-SCNC: 103 MMOL/L (ref 98–107)
CO2 SERPL-SCNC: 25 MMOL/L (ref 22–29)
CREAT SERPL-MCNC: 0.89 MG/DL (ref 0.76–1.27)
DEPRECATED RDW RBC AUTO: 40 FL (ref 37–54)
EGFRCR SERPLBLD CKD-EPI 2021: 108.4 ML/MIN/1.73
EOSINOPHIL # BLD AUTO: 0.2 10*3/MM3 (ref 0–0.4)
EOSINOPHIL NFR BLD AUTO: 4 % (ref 0.3–6.2)
ERYTHROCYTE [DISTWIDTH] IN BLOOD BY AUTOMATED COUNT: 13.2 % (ref 12.3–15.4)
GLOBULIN UR ELPH-MCNC: 2.6 GM/DL
GLUCOSE SERPL-MCNC: 109 MG/DL (ref 65–99)
HCT VFR BLD AUTO: 46.2 % (ref 37.5–51)
HGB BLD-MCNC: 17 G/DL (ref 13–17.7)
IMM GRANULOCYTES # BLD AUTO: 0.01 10*3/MM3 (ref 0–0.05)
IMM GRANULOCYTES NFR BLD AUTO: 0.2 % (ref 0–0.5)
LYMPHOCYTES # BLD AUTO: 1.77 10*3/MM3 (ref 0.7–3.1)
LYMPHOCYTES NFR BLD AUTO: 35.6 % (ref 19.6–45.3)
MAGNESIUM SERPL-MCNC: 1.8 MG/DL (ref 1.6–2.6)
MCH RBC QN AUTO: 31.2 PG (ref 26.6–33)
MCHC RBC AUTO-ENTMCNC: 36.8 G/DL (ref 31.5–35.7)
MCV RBC AUTO: 84.8 FL (ref 79–97)
MONOCYTES # BLD AUTO: 0.4 10*3/MM3 (ref 0.1–0.9)
MONOCYTES NFR BLD AUTO: 8 % (ref 5–12)
NEUTROPHILS NFR BLD AUTO: 2.56 10*3/MM3 (ref 1.7–7)
NEUTROPHILS NFR BLD AUTO: 51.6 % (ref 42.7–76)
PLATELET # BLD AUTO: 190 10*3/MM3 (ref 140–450)
PMV BLD AUTO: 10.8 FL (ref 6–12)
POTASSIUM SERPL-SCNC: 4.5 MMOL/L (ref 3.5–5.2)
PROT SERPL-MCNC: 6.9 G/DL (ref 6–8.5)
RBC # BLD AUTO: 5.45 10*6/MM3 (ref 4.14–5.8)
SODIUM SERPL-SCNC: 136 MMOL/L (ref 136–145)
TSH SERPL DL<=0.05 MIU/L-ACNC: 1.73 UIU/ML (ref 0.27–4.2)
WBC NRBC COR # BLD AUTO: 4.97 10*3/MM3 (ref 3.4–10.8)

## 2024-08-01 PROCEDURE — 83735 ASSAY OF MAGNESIUM: CPT

## 2024-08-01 PROCEDURE — 36415 COLL VENOUS BLD VENIPUNCTURE: CPT

## 2024-08-01 PROCEDURE — 80050 GENERAL HEALTH PANEL: CPT

## 2024-08-01 PROCEDURE — 99285 EMERGENCY DEPT VISIT HI MDM: CPT

## 2024-08-01 PROCEDURE — 71275 CT ANGIOGRAPHY CHEST: CPT

## 2024-08-01 PROCEDURE — 25510000001 IOPAMIDOL PER 1 ML

## 2024-08-01 PROCEDURE — 70450 CT HEAD/BRAIN W/O DYE: CPT

## 2024-08-01 PROCEDURE — 93005 ELECTROCARDIOGRAM TRACING: CPT

## 2024-08-01 PROCEDURE — 93010 ELECTROCARDIOGRAM REPORT: CPT | Performed by: STUDENT IN AN ORGANIZED HEALTH CARE EDUCATION/TRAINING PROGRAM

## 2024-08-01 RX ADMIN — IOPAMIDOL 100 ML: 755 INJECTION, SOLUTION INTRAVENOUS at 10:27

## 2024-08-01 NOTE — TELEPHONE ENCOUNTER
Caller: Danny Ponce    Relationship: Self    Best call back number: [phone]    What is the best time to reach you: SOON PLEASE     Who are you requesting to speak with (clinical staff, provider,  specific staff member): PROVIDER OR CLINICAL STAFF         What was the call regarding: PATIENT REQUESTING A CALL BACK TO SEE IF HE NEEDS TO GO TO ER OVER LAB RESULTS SHOWING A HIGH D DIMER LEVEL     Is it okay if the provider responds through MyChart: PREFERS A CALL BACK

## 2024-08-01 NOTE — ED PROVIDER NOTES
"Subjective   History of Present Illness  Patient is a 44-year-old male who presents emergency department after an abnormal lab.  Patient was at his primary care provider yesterday and had lab work done.  Reports that he had an elevated D-dimer.  Per our records, D-dimer was 0.84.  Patient also reports that he has anxiety.  States that a few years ago around this time of year, family member had passed away and he thinks that he is likely experiencing anxiety from that.  He does report that he has frequent tension headaches and feels like he has a band around his head.  He also states that approximately 1 week ago, he noticed tingling in his left hand.  He states that it still does not feel right at this time.  He is correlating this with probable anxiety.  He currently denies any chest pain or shortness of breath.  He reports that his family member passed away from a pulmonary embolism.  Patient states that he takes a baby aspirin once or twice a month or whenever he thinks to take it.  He is not on any other anticoagulation.  He also reports that he had a stress test approximately 2 years ago which was normal.  Patient reports that he is active and exercises.  Reports that Dr. Mays just started him on Xanax for anxiety.  He states that he took 1 last night, but did not see much improvement but did report that he felt a little bit more calm.  Patient reports that he has generalized fatigue that has been ongoing for \"years\", but feels like it has worsened recently.        Review of Systems   Constitutional:  Positive for fatigue.   Neurological:  Positive for headaches.   Psychiatric/Behavioral:  The patient is nervous/anxious.    All other systems reviewed and are negative.      Past Medical History:   Diagnosis Date    Headache     Hypertension 12/6/18    Neck pain        No Known Allergies    Past Surgical History:   Procedure Laterality Date    COLONOSCOPY N/A 03/23/2018    Procedure: COLONOSCOPY WITH " ANESTHESIA;  Surgeon: Tomas Mendez MD;  Location: [facility] ENDOSCOPY;  Service: Gastroenterology    HEMORRHOIDECTOMY  2024    NO PAST SURGERIES         Family History   Problem Relation Age of Onset    Diabetes Mother     Stroke Mother     Clotting disorder Mother     Irregular heart beat Mother     Pulmonary embolism Mother     Stomach cancer Maternal Grandfather     Irregular heart beat Father     Heart disease Father     Alcohol abuse Paternal Grandfather     Colon cancer Neg Hx     Colon polyps Neg Hx        Social History     Socioeconomic History    Marital status:    Tobacco Use    Smoking status: Former     Current packs/day: 1.00     Types: Cigarettes     Passive exposure: Past    Smokeless tobacco: Never   Vaping Use    Vaping status: Never Used   Substance and Sexual Activity    Alcohol use: Yes     Alcohol/week: 2.0 standard drinks of alcohol     Types: 2 Cans of beer per week     Comment: occ    Drug use: No    Sexual activity: Yes     Partners: Female     Birth control/protection: Condom           Objective   Physical Exam  Vitals and nursing note reviewed.   Constitutional:       General: He is not in acute distress.     Appearance: Normal appearance. He is normal weight. He is not ill-appearing or toxic-appearing.   HENT:      Head: Normocephalic and atraumatic.      Nose: Nose normal.   Eyes:      Extraocular Movements: Extraocular movements intact.      Conjunctiva/sclera: Conjunctivae normal.      Pupils: Pupils are equal, round, and reactive to light.   Cardiovascular:      Rate and Rhythm: Normal rate and regular rhythm.      Pulses: Normal pulses.      Heart sounds: Normal heart sounds.   Pulmonary:      Effort: Pulmonary effort is normal.      Breath sounds: Normal breath sounds.   Abdominal:      General: Abdomen is flat. Bowel sounds are normal. There is no distension.      Palpations: Abdomen is soft.      Tenderness: There is no abdominal tenderness.   Musculoskeletal:          General: No swelling. Normal range of motion.      Cervical back: Normal range of motion and neck supple.      Right lower leg: No edema.      Left lower leg: No edema.   Skin:     General: Skin is warm and dry.      Findings: No erythema.   Neurological:      General: No focal deficit present.      Mental Status: He is alert and oriented to person, place, and time. Mental status is at baseline.      GCS: GCS eye subscore is 4. GCS verbal subscore is 5. GCS motor subscore is 6.      Cranial Nerves: Cranial nerves 2-12 are intact.      Sensory: Sensation is intact.      Motor: Motor function is intact.      Coordination: Coordination is intact.      Gait: Gait is intact.      Comments:  strength 5/5 bilaterally, reports that his sensation is equal bilaterally in upper and lower extremities.   Psychiatric:         Mood and Affect: Mood normal.         Behavior: Behavior normal.         Thought Content: Thought content normal.         Judgment: Judgment normal.         Procedures         CT Angiogram Chest   Final Result      CT Head Without Contrast   Final Result   Impression: No acute intracranial abnormality.                This report was signed and finalized on 8/1/2024 10:38 AM by Dr. Quincy Donovan MD.              Labs Reviewed   COMPREHENSIVE METABOLIC PANEL - Abnormal; Notable for the following components:       Result Value    Glucose 109 (*)     All other components within normal limits    Narrative:     GFR Normal >60  Chronic Kidney Disease <60  Kidney Failure <15     CBC WITH AUTO DIFFERENTIAL - Abnormal; Notable for the following components:    MCHC 36.8 (*)     All other components within normal limits   MAGNESIUM - Normal   TSH - Normal   CBC AND DIFFERENTIAL    Narrative:     The following orders were created for panel order CBC & Differential.  Procedure                               Abnormality         Status                     ---------                               -----------          "------                     CBC Auto Differential[[id number]]        Abnormal            Final result                 Please view results for these tests on the individual orders.           ED Course                                             Medical Decision Making  Patient is a 44-year-old male who presents emergency department after an abnormal lab.  Patient was at his primary care provider yesterday and had lab work done.  Reports that he had an elevated D-dimer.  Per our records, D-dimer was 0.84.  Patient also reports that he has anxiety.  States that a few years ago around this time of year, family member had passed away and he thinks that he is likely experiencing anxiety from that.  He does report that he has frequent tension headaches and feels like he has a band around his head.  He also states that approximately 1 week ago, he noticed tingling in his left hand.  He states that it still does not feel right at this time.  He is correlating this with probable anxiety.  He currently denies any chest pain or shortness of breath.  He reports that his family member passed away from a pulmonary embolism.  Patient states that he takes a baby aspirin once or twice a month or whenever he thinks to take it.  He is not on any other anticoagulation.  He also reports that he had a stress test approximately 2 years ago which was normal.  Patient reports that he is active and exercises.  Reports that Dr. Mays just started him on Xanax for anxiety.  He states that he took 1 last night, but did not see much improvement but did report that he felt a little bit more calm.  Patient reports that he has generalized fatigue that has been ongoing for \"years\", but feels like it has worsened recently.    Patient was non-toxic appearing on arrival. Vital signs stable.     Patient's presentation raises suspicion for differentials including, but not limited to, pulmonary embolism, anxiety, electrolyte imbalance.     External (non-ED) " record review: 7/31/24 labs reviewed, D-dimer 0.84    Given this, laboratory studies and imaging studies were ordered including CBC, CMP, TSH, magnesium, EKG, CT angiogram chest, CT head without contrast.     Imaging was reviewed by radiologist. Please refer to above section for results that were interpreted by radiologist. CTA chest revealed 2 tiny RIGHT lower lobe pulmonary emboli noted. Very low clot burden. No acute lung disease.    Labs were reviewed and interpreted. Please refer to above section for results.    On re-evaluation, patient remained hemodynamically stable and appeared to be comfortable and in no acute distress. Oxygen saturations high 90s on room air. Patient with no unilateral leg swelling or erythema on exam. Eliquis has been prescribed at discharge for pulmonary emboli. CTA did not reveal evidence of heart strain. I discussed all of the lab and imaging results with the patient during this visit in the emergency department. I answered all the questions regarding the emergency department evaluation, diagnosis, and treatment plan. We talked about how crucial it is for the patient to follow up by calling their primary care provider as soon as possible to schedule an appointment for within the next few days or as soon as possible so that the symptoms can be reassessed to see if they have improved or to answer any additional questions. I also provided the patient with advice on returning safely and urged the patient to visit the emergency department right away if any worsening or new symptoms appeared. The patient verbalized understanding of the discharge instructions and agreed with them. Danny was discharged in stable condition.    Signed by:   JOSSY Bey 8/1/2024 10:55 CDT     Dragon disclaimer:  Part of this note may be an electronic transcription/translation of spoken language to printed text using the Dragon Dictation System.    Problems Addressed:  History of anxiety: acute illness or  injury  Multiple subsegmental pulmonary emboli without acute cor pulmonale: acute illness or injury  Other fatigue: acute illness or injury    Amount and/or Complexity of Data Reviewed  Labs: ordered.  Radiology: ordered.  ECG/medicine tests: ordered.    Risk  Prescription drug management.        Final diagnoses:   Multiple subsegmental pulmonary emboli without acute cor pulmonale   History of anxiety   Other fatigue       ED Disposition  ED Disposition       ED Disposition   Discharge    Condition   Stable    Comment   --               Jimbo Mays,   [address]  [address]  [place] [id number]  [phone]    Schedule an appointment as soon as possible for a visit in 1 day      [facility] EMERGENCY DEPARTMENT  [address] [place] [id number] [phone]  Go to   If symptoms worsen         Medication List        New Prescriptions      Apixaban Starter Pack tablet therapy pack  Take two 5 mg tablets by mouth every 12 hours for 7 days. Followed by one 5 mg tablet every 12 hours. (Dispense starter pack if available)               Where to Get Your Medications        These medications were sent to [facility]/pharmacy #[id number] - [place], [place] - [address] - [phone]  - [phone]   [address] [address]      Phone: [phone]   Apixaban Starter Pack tablet therapy pack            Mariam Espinoza APRN  08/01/24 1100

## 2024-08-01 NOTE — DISCHARGE INSTRUCTIONS
Today you are seen in the ER for your symptoms.  Your CT chest did reveal 2 tiny blood clots in your right lower lobe.  Eliquis which is a blood thinner has been prescribed.  You will need to follow-up with primary care provider soon as possible reassess symptoms.  Like we discussed, please do not take any NSAIDs while on Eliquis but okay to take Tylenol as needed for pain.  Please return to the ER for any new or worsening symptoms like we discussed

## 2024-08-01 NOTE — TELEPHONE ENCOUNTER
Spoke with patient to schedule hospital follow up, let patient know he can't have two PCPs at once. I explained to him even though they are both [employer] doctors that they are in separate practices. He chose to keep Mays and voiced verbal understanding.

## 2024-08-02 ENCOUNTER — OFFICE VISIT (OUTPATIENT)
Dept: FAMILY MEDICINE CLINIC | Facility: CLINIC | Age: 44
End: 2024-08-02
Payer: COMMERCIAL

## 2024-08-02 VITALS
SYSTOLIC BLOOD PRESSURE: 130 MMHG | RESPIRATION RATE: 18 BRPM | WEIGHT: 230 LBS | OXYGEN SATURATION: 97 % | TEMPERATURE: 98.2 F | BODY MASS INDEX: 32.2 KG/M2 | DIASTOLIC BLOOD PRESSURE: 82 MMHG | HEIGHT: 71 IN | HEART RATE: 86 BPM

## 2024-08-02 DIAGNOSIS — I26.99 OTHER ACUTE PULMONARY EMBOLISM WITHOUT ACUTE COR PULMONALE: Primary | ICD-10-CM

## 2024-08-02 DIAGNOSIS — Z82.49 FAMILY HISTORY OF PULMONARY EMBOLISM: ICD-10-CM

## 2024-08-02 LAB
QT INTERVAL: 374 MS
QTC INTERVAL: 406 MS

## 2024-08-02 PROCEDURE — 99213 OFFICE O/P EST LOW 20 MIN: CPT | Performed by: NURSE PRACTITIONER

## 2024-08-02 NOTE — PROGRESS NOTES
" JOSSY Renteria  [facility]   Family Medicine  [address]  [place] [address]  Phone: [phone]  Fax: [phone]         Chief Complaint:  Chief Complaint   Patient presents with    Hospital Follow Up Visit        History:  Danny Ponce is a 44 y.o. male that is an established patient. He  is here for evaluation of the above complaint.    HPI     He was seen here 2024, lab work after that visit showed an elevated d dimer. CTA in the ED showed 2 tiny PE in the right lower lobe. He is on Eliquis as of yesterday.    He has a family history of PE in his brother, which he  from at age 44, sister had a PE in her 30s while on Jeanine OCP. Mom had blood clots with PFO. He thinks his mom had either protein S or C deficiency.     He continues with anxiety, declines other medication or behavioral health referral at this time.             ROS:  Review of Systems   Constitutional: Negative.    HENT: Negative.     Respiratory:  Negative for cough and shortness of breath.    Psychiatric/Behavioral:  The patient is nervous/anxious.          reports that he has quit smoking. His smoking use included cigarettes. He has been exposed to tobacco smoke. He has never used smokeless tobacco. He reports current alcohol use of about 2.0 standard drinks of alcohol per week. He reports that he does not use drugs.    Current Outpatient Medications   Medication Instructions    ALPRAZolam (XANAX) 0.5 mg, Oral, 2 Times Daily PRN    Apixaban Starter Pack 5 mg, Oral, See Admin Instructions    doxycycline (VIBRAMYCIN) 100 mg, Oral, 2 Times Daily    multivitamin with minerals tablet tablet 1 tablet, Oral, Daily    Ped Multivitamins-Fl-Iron (Multi-Vitamin/Fluoride/Iron) 0.25-10 MG/ML solution Take  by mouth.       OBJECTIVE:  /82   Pulse 86   Temp 98.2 °F (36.8 °C) (Temporal)   Resp 18   Ht 180.3 cm (70.98\")   Wt 104 kg (230 lb)   SpO2 97%   BMI 32.10 kg/m²    Physical Exam  Vitals and " nursing note reviewed.   Constitutional:       Appearance: Normal appearance.   HENT:      Head: Normocephalic and atraumatic.      Nose: Nose normal.   Eyes:      Conjunctiva/sclera: Conjunctivae normal.   Cardiovascular:      Rate and Rhythm: Normal rate and regular rhythm.   Pulmonary:      Effort: Pulmonary effort is normal. No respiratory distress.      Breath sounds: Normal breath sounds. No wheezing or rales.   Neurological:      General: No focal deficit present.      Mental Status: He is alert and oriented to person, place, and time.   Psychiatric:         Mood and Affect: Mood normal.         Behavior: Behavior normal.         Procedures    Assessment/Plan:     Diagnoses and all orders for this visit:    1. Other acute pulmonary embolism without acute cor pulmonale (Primary)  -     Protein C & S Antigens; Future  -     Antithrombin Antigen; Future  -     Factor 5 Leiden; Future  -     Factor 2 Activity; Future    2. Family history of pulmonary embolism  -     Protein C & S Antigens; Future  -     Antithrombin Antigen; Future  -     Factor 5 Leiden; Future  -     Factor 2 Activity; Future             An After Visit Summary was printed and given to the patient at discharge.  No follow-ups on file.       There are no Patient Instructions on file for this visit.      Discussion:     Check hypercoagulation labs    I spent 28 minutes caring for Danny on this date of service. This time includes time spent by me in the following activities: preparing for the visit, reviewing tests, performing a medically appropriate examination and/or evaluation, counseling and educating the patient/family/caregiver, documenting information in the medical record, independently interpreting results and communicating that information with the patient/family/caregiver, ordering test(s), obtaining a separately obtained history, and reviewing a separately obtained history     Luz FENTON 8/2/2024   Electronically signed.

## 2024-08-06 ENCOUNTER — LAB (OUTPATIENT)
Dept: LAB | Facility: HOSPITAL | Age: 44
End: 2024-08-06
Payer: COMMERCIAL

## 2024-08-06 DIAGNOSIS — I26.99 OTHER ACUTE PULMONARY EMBOLISM WITHOUT ACUTE COR PULMONALE: ICD-10-CM

## 2024-08-06 DIAGNOSIS — Z82.49 FAMILY HISTORY OF PULMONARY EMBOLISM: ICD-10-CM

## 2024-08-06 PROCEDURE — 36415 COLL VENOUS BLD VENIPUNCTURE: CPT

## 2024-08-06 PROCEDURE — 85302 CLOT INHIBIT PROT C ANTIGEN: CPT

## 2024-08-06 PROCEDURE — 85305 CLOT INHIBIT PROT S TOTAL: CPT

## 2024-08-06 PROCEDURE — 81241 F5 GENE: CPT

## 2024-08-06 PROCEDURE — 85306 CLOT INHIBIT PROT S FREE: CPT

## 2024-08-06 PROCEDURE — 85301 ANTITHROMBIN III ANTIGEN: CPT

## 2024-08-06 PROCEDURE — 85210 CLOT FACTOR II PROTHROM SPEC: CPT

## 2024-08-07 LAB — F5 GENE MUT ANL BLD/T: NORMAL

## 2024-08-08 LAB
AT III AG ACT/NOR PPP IA: 92 % (ref 72–124)
PROTHROM ACT/NOR PPP: 106 % (ref 50–154)

## 2024-08-09 LAB
PROT C AG ACT/NOR PPP IA: 88 % (ref 60–150)
PROT S AG ACT/NOR PPP IA: 64 % (ref 60–150)
PROT S FREE AG ACT/NOR PPP IA: 87 % (ref 61–136)
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
## 2024-08-12 ENCOUNTER — OFFICE VISIT (OUTPATIENT)
Dept: FAMILY MEDICINE CLINIC | Facility: CLINIC | Age: 44
End: 2024-08-12
Payer: COMMERCIAL

## 2024-08-12 VITALS
RESPIRATION RATE: 19 BRPM | HEART RATE: 80 BPM | HEIGHT: 71 IN | TEMPERATURE: 98.2 F | WEIGHT: 229 LBS | BODY MASS INDEX: 32.06 KG/M2 | SYSTOLIC BLOOD PRESSURE: 130 MMHG | DIASTOLIC BLOOD PRESSURE: 75 MMHG | OXYGEN SATURATION: 98 %

## 2024-08-12 DIAGNOSIS — Z82.49 FAMILY HISTORY OF PULMONARY EMBOLISM: ICD-10-CM

## 2024-08-12 DIAGNOSIS — Z12.5 PROSTATE CANCER SCREENING: ICD-10-CM

## 2024-08-12 DIAGNOSIS — I26.99 OTHER ACUTE PULMONARY EMBOLISM WITHOUT ACUTE COR PULMONALE: ICD-10-CM

## 2024-08-12 DIAGNOSIS — F41.9 ANXIETY: Primary | ICD-10-CM

## 2024-08-12 PROCEDURE — 99214 OFFICE O/P EST MOD 30 MIN: CPT | Performed by: NURSE PRACTITIONER

## 2024-08-12 NOTE — PROGRESS NOTES
JOSSY Renteria  [facility]   Family Medicine  [address]  [place] [address]  Phone: [phone]  Fax: [phone]         Chief Complaint:  Chief Complaint   Patient presents with    Follow-up     Follow up on blood work 8/6/24        History:  Danny Ponce is a 44 y.o. male that is an established patient. He  is here for evaluation of the above complaint.    HPI     Factor 2 activity, factor 5 Leiden, antithrombin antigen, Protein c and s antigens all within normal limits. He continues on Eliquis.     He continues with anxiety, and Xanax has been helpful. He declines other medication or referral to behavioral health. He is very concerned that he has cancer. He has a family history of prostate cancer in his grandfather; he requests a PSA blood test. He has had colonoscopy per Dr. Mendez 3/2018, will be due to screening c/scope in the next year.               ROS:  Review of Systems   Constitutional: Negative.    HENT: Negative.     Respiratory: Negative.     Cardiovascular: Negative.    Psychiatric/Behavioral:  The patient is nervous/anxious.          reports that he has quit smoking. His smoking use included cigarettes. He has been exposed to tobacco smoke. He has never used smokeless tobacco. He reports current alcohol use of about 2.0 standard drinks of alcohol per week. He reports that he does not use drugs.    Current Outpatient Medications   Medication Instructions    ALPRAZolam (XANAX) 0.5 mg, Oral, 2 Times Daily PRN    Apixaban Starter Pack 5 mg, Oral, See Admin Instructions    doxycycline (VIBRAMYCIN) 100 mg, Oral, 2 Times Daily    multivitamin with minerals tablet tablet 1 tablet, Oral, Daily    Ped Multivitamins-Fl-Iron (Multi-Vitamin/Fluoride/Iron) 0.25-10 MG/ML solution Take  by mouth.       OBJECTIVE:  /75 (BP Location: Left arm, Patient Position: Sitting, Cuff Size: Adult)   Pulse 80   Temp 98.2 °F (36.8 °C) (Infrared)   Resp 19   Ht 180.3 cm  "(70.98\")   Wt 104 kg (229 lb)   SpO2 98%   BMI 31.95 kg/m²    Physical Exam  Vitals and nursing note reviewed.   Constitutional:       Appearance: Normal appearance.   HENT:      Head: Normocephalic and atraumatic.      Nose: Nose normal.   Eyes:      Conjunctiva/sclera: Conjunctivae normal.   Cardiovascular:      Rate and Rhythm: Normal rate and regular rhythm.   Pulmonary:      Effort: Pulmonary effort is normal. No respiratory distress.      Breath sounds: Normal breath sounds. No wheezing or rales.   Neurological:      General: No focal deficit present.      Mental Status: He is alert and oriented to person, place, and time.   Psychiatric:         Attention and Perception: Attention and perception normal.         Mood and Affect: Affect normal. Mood is anxious.         Speech: Speech normal.         Behavior: Behavior normal. Behavior is cooperative.         Thought Content: Thought content normal.         Cognition and Memory: Cognition and memory normal.         Judgment: Judgment normal.         Procedures    Assessment/Plan:     Diagnoses and all orders for this visit:    1. Anxiety (Primary)    2. Prostate cancer screening  -     PSA SCREENING; Future    3. Other acute pulmonary embolism without acute cor pulmonale    4. Family history of pulmonary embolism      Plan:  Continue Eliquis, blood work as above. He will think about the possibility of therapy in the future           An After Visit Summary was printed and given to the patient at discharge.  No follow-ups on file.       There are no Patient Instructions on file for this visit.      Discussion:     I spent 32 minutes caring for Danny on this date of service. This time includes time spent by me in the following activities: preparing for the visit, reviewing tests, performing a medically appropriate examination and/or evaluation, counseling and educating the patient/family/caregiver, documenting information in the medical record, independently " interpreting results and communicating that information with the patient/family/caregiver, care coordination, ordering test(s), obtaining a separately obtained history, and reviewing a separately obtained history     Luz FENTON 8/13/2024   Electronically signed.

## 2024-08-16 ENCOUNTER — LAB (OUTPATIENT)
Dept: LAB | Facility: HOSPITAL | Age: 44
End: 2024-08-16
Payer: COMMERCIAL

## 2024-08-16 DIAGNOSIS — Z12.5 PROSTATE CANCER SCREENING: ICD-10-CM

## 2024-08-16 LAB — PSA SERPL-MCNC: 0.34 NG/ML (ref 0–4)

## 2024-08-16 PROCEDURE — G0103 PSA SCREENING: HCPCS

## 2024-08-16 PROCEDURE — 36415 COLL VENOUS BLD VENIPUNCTURE: CPT

## 2024-08-23 ENCOUNTER — TELEPHONE (OUTPATIENT)
Dept: FAMILY MEDICINE CLINIC | Facility: CLINIC | Age: 44
End: 2024-08-23
Payer: COMMERCIAL

## 2024-08-23 NOTE — TELEPHONE ENCOUNTER
Caller: Danny Ponce    Relationship: Self    Best call back number: [phone]     What is the medical concern/diagnosis: NECK PAIN AND LEFT HAND TINGLING    What specialty or service is being requested: PHYSICAL THERAPY    What is the provider, practice or medical service name: [facility]    What is the office location: [place]    What is the office phone number: [phone]    Any additional details: WAS PREVIOUSLY SENT TO [facility] WOULD LIKE IT CHANGED TO [facility]

## 2024-08-26 ENCOUNTER — TELEPHONE (OUTPATIENT)
Dept: FAMILY MEDICINE CLINIC | Facility: CLINIC | Age: 44
End: 2024-08-26
Payer: COMMERCIAL

## 2024-08-26 NOTE — TELEPHONE ENCOUNTER
Caller: Danny Ponce    Relationship: Self    Best call back number: [phone]    Any additional details: PATIENT REQUESTING REFERRAL BE SENT TO [facility].

## 2024-08-27 DIAGNOSIS — F41.9 ANXIETY: ICD-10-CM

## 2024-08-27 RX ORDER — ALPRAZOLAM 0.5 MG
0.5 TABLET ORAL 2 TIMES DAILY PRN
Qty: 7 TABLET | Refills: 0 | Status: SHIPPED | OUTPATIENT
Start: 2024-08-27

## 2024-08-29 ENCOUNTER — TELEPHONE (OUTPATIENT)
Dept: FAMILY MEDICINE CLINIC | Facility: CLINIC | Age: 44
End: 2024-08-29
Payer: COMMERCIAL

## 2024-08-29 DIAGNOSIS — I26.99 OTHER ACUTE PULMONARY EMBOLISM WITHOUT ACUTE COR PULMONALE: Primary | ICD-10-CM

## 2024-08-29 NOTE — TELEPHONE ENCOUNTER
Caller: Danny Ponce    Relationship: Self    Best call back number: [phone]     What is the medical concern/diagnosis: BLOOD CLOT    What specialty or service is being requested: ULTRASOUND    What is the provider, practice or medical service name:     NOBODY IN PARTICULAR      Any additional details: WAS DISCUSSED AS A POSSIBILITY BACK AT 08/12/24 VISIT. PATIENT WOULD LIKE TO HAVE THIS DONE.    PATIENT WILL BE LEAVING SATURDAY FOR A LONG CAR TRIP AND WOULD LIKE IF ANY WAY POSSIBLE TO HAVE DONE TODAY OR TOMORROW.

## 2024-08-30 ENCOUNTER — HOSPITAL ENCOUNTER (OUTPATIENT)
Dept: ULTRASOUND IMAGING | Facility: HOSPITAL | Age: 44
Discharge: HOME | End: 2024-08-30
Admitting: FAMILY MEDICINE
Payer: COMMERCIAL

## 2024-08-30 DIAGNOSIS — I26.99 OTHER ACUTE PULMONARY EMBOLISM WITHOUT ACUTE COR PULMONALE: ICD-10-CM

## 2024-08-30 DIAGNOSIS — I26.99 OTHER ACUTE PULMONARY EMBOLISM WITHOUT ACUTE COR PULMONALE: Primary | ICD-10-CM

## 2024-08-30 PROCEDURE — 93970 EXTREMITY STUDY: CPT

## 2024-09-04 ENCOUNTER — TELEPHONE (OUTPATIENT)
Dept: FAMILY MEDICINE CLINIC | Facility: CLINIC | Age: 44
End: 2024-09-04
Payer: COMMERCIAL

## 2024-09-04 DIAGNOSIS — Z82.49 FAMILY HISTORY OF PULMONARY EMBOLISM: ICD-10-CM

## 2024-09-04 DIAGNOSIS — I26.99 OTHER ACUTE PULMONARY EMBOLISM WITHOUT ACUTE COR PULMONALE: Primary | ICD-10-CM

## 2024-09-04 NOTE — TELEPHONE ENCOUNTER
Pt called in regards to needing a referral from the [place], made pt aware that I will send deb a message and let her know what we could do

## 2024-09-12 ENCOUNTER — OFFICE VISIT (OUTPATIENT)
Dept: FAMILY MEDICINE CLINIC | Facility: CLINIC | Age: 44
End: 2024-09-12
Payer: COMMERCIAL

## 2024-09-12 VITALS
BODY MASS INDEX: 31.5 KG/M2 | RESPIRATION RATE: 18 BRPM | DIASTOLIC BLOOD PRESSURE: 78 MMHG | TEMPERATURE: 97.8 F | OXYGEN SATURATION: 98 % | HEART RATE: 85 BPM | WEIGHT: 225 LBS | HEIGHT: 71 IN | SYSTOLIC BLOOD PRESSURE: 114 MMHG

## 2024-09-12 DIAGNOSIS — I26.99 OTHER ACUTE PULMONARY EMBOLISM WITHOUT ACUTE COR PULMONALE: ICD-10-CM

## 2024-09-12 DIAGNOSIS — R20.2 NUMBNESS AND TINGLING IN LEFT HAND: Primary | ICD-10-CM

## 2024-09-12 DIAGNOSIS — R20.0 NUMBNESS AND TINGLING IN LEFT HAND: Primary | ICD-10-CM

## 2024-09-12 PROCEDURE — 99214 OFFICE O/P EST MOD 30 MIN: CPT | Performed by: FAMILY MEDICINE

## 2024-09-27 ENCOUNTER — LAB (OUTPATIENT)
Dept: LAB | Facility: HOSPITAL | Age: 44
End: 2024-09-27
Payer: COMMERCIAL

## 2024-09-27 ENCOUNTER — CONSULT (OUTPATIENT)
Dept: ONCOLOGY | Facility: CLINIC | Age: 44
End: 2024-09-27
Payer: COMMERCIAL

## 2024-09-27 VITALS
OXYGEN SATURATION: 98 % | HEART RATE: 73 BPM | SYSTOLIC BLOOD PRESSURE: 122 MMHG | TEMPERATURE: 97 F | HEIGHT: 71 IN | DIASTOLIC BLOOD PRESSURE: 82 MMHG | BODY MASS INDEX: 31.86 KG/M2 | RESPIRATION RATE: 16 BRPM | WEIGHT: 227.6 LBS

## 2024-09-27 DIAGNOSIS — I26.99 OTHER ACUTE PULMONARY EMBOLISM WITHOUT ACUTE COR PULMONALE: ICD-10-CM

## 2024-09-27 DIAGNOSIS — I26.99 OTHER ACUTE PULMONARY EMBOLISM WITHOUT ACUTE COR PULMONALE: Primary | ICD-10-CM

## 2024-09-27 PROCEDURE — 36415 COLL VENOUS BLD VENIPUNCTURE: CPT

## 2024-09-27 PROCEDURE — 85670 THROMBIN TIME PLASMA: CPT

## 2024-09-27 PROCEDURE — 86146 BETA-2 GLYCOPROTEIN ANTIBODY: CPT

## 2024-09-27 PROCEDURE — 85613 RUSSELL VIPER VENOM DILUTED: CPT

## 2024-09-27 PROCEDURE — 86147 CARDIOLIPIN ANTIBODY EA IG: CPT

## 2024-09-27 PROCEDURE — 85732 THROMBOPLASTIN TIME PARTIAL: CPT

## 2024-09-27 PROCEDURE — 85705 THROMBOPLASTIN INHIBITION: CPT

## 2024-09-28 LAB
CARDIOLIPIN IGA SER IA-ACNC: <9 APL U/ML (ref 0–11)
CARDIOLIPIN IGG SER IA-ACNC: <9 GPL U/ML (ref 0–14)
CARDIOLIPIN IGM SER IA-ACNC: <9 MPL U/ML (ref 0–12)

## 2024-09-30 LAB
APTT SCREEN TO CONFIRM RATIO: 1.12 RATIO (ref 0–1.34)
B2 GLYCOPROT1 IGG SER-ACNC: <9 GPI IGG UNITS (ref 0–20)
B2 GLYCOPROT1 IGM SER-ACNC: <9 GPI IGM UNITS (ref 0–32)
CONFIRM APTT/NORMAL: 37.2 SEC (ref 0–47.6)
LA 2 SCREEN W REFLEX-IMP: NORMAL
SCREEN APTT: 32.4 SEC (ref 0–43.5)
SCREEN DRVVT: 40.5 SEC (ref 0–47)
THROMBIN TIME: 17.8 SEC (ref 0–23)

## 2024-10-02 ENCOUNTER — HOSPITAL ENCOUNTER (EMERGENCY)
Facility: HOSPITAL | Age: 44
Discharge: HOME | End: 2024-10-02
Payer: COMMERCIAL

## 2024-10-02 ENCOUNTER — APPOINTMENT (OUTPATIENT)
Dept: CT IMAGING | Facility: HOSPITAL | Age: 44
End: 2024-10-02
Payer: COMMERCIAL

## 2024-10-02 VITALS
TEMPERATURE: 97.8 F | WEIGHT: 225 LBS | RESPIRATION RATE: 16 BRPM | BODY MASS INDEX: 31.5 KG/M2 | DIASTOLIC BLOOD PRESSURE: 86 MMHG | HEIGHT: 71 IN | OXYGEN SATURATION: 95 % | HEART RATE: 77 BPM | SYSTOLIC BLOOD PRESSURE: 122 MMHG

## 2024-10-02 DIAGNOSIS — Z86.711 HISTORY OF PULMONARY EMBOLUS (PE): ICD-10-CM

## 2024-10-02 DIAGNOSIS — F41.9 ANXIETY: Primary | ICD-10-CM

## 2024-10-02 DIAGNOSIS — F41.9 ANXIETY: ICD-10-CM

## 2024-10-02 LAB
ALBUMIN SERPL-MCNC: 4.3 G/DL (ref 3.5–5.2)
ALBUMIN/GLOB SERPL: 1.7 G/DL
ALP SERPL-CCNC: 84 U/L (ref 39–117)
ALT SERPL W P-5'-P-CCNC: 20 U/L (ref 1–41)
ANION GAP SERPL CALCULATED.3IONS-SCNC: 7 MMOL/L (ref 5–15)
APTT PPP: 27.6 SECONDS (ref 24.5–36)
AST SERPL-CCNC: 16 U/L (ref 1–40)
BASOPHILS # BLD AUTO: 0.04 10*3/MM3 (ref 0–0.2)
BASOPHILS NFR BLD AUTO: 0.6 % (ref 0–1.5)
BILIRUB SERPL-MCNC: 0.8 MG/DL (ref 0–1.2)
BUN SERPL-MCNC: 12 MG/DL (ref 6–20)
BUN/CREAT SERPL: 13.8 (ref 7–25)
CALCIUM SPEC-SCNC: 9.2 MG/DL (ref 8.6–10.5)
CHLORIDE SERPL-SCNC: 104 MMOL/L (ref 98–107)
CO2 SERPL-SCNC: 27 MMOL/L (ref 22–29)
CREAT SERPL-MCNC: 0.87 MG/DL (ref 0.76–1.27)
DEPRECATED RDW RBC AUTO: 39.1 FL (ref 37–54)
EGFRCR SERPLBLD CKD-EPI 2021: 109.1 ML/MIN/1.73
EOSINOPHIL # BLD AUTO: 0.04 10*3/MM3 (ref 0–0.4)
EOSINOPHIL NFR BLD AUTO: 0.6 % (ref 0.3–6.2)
ERYTHROCYTE [DISTWIDTH] IN BLOOD BY AUTOMATED COUNT: 12.7 % (ref 12.3–15.4)
GLOBULIN UR ELPH-MCNC: 2.5 GM/DL
GLUCOSE SERPL-MCNC: 111 MG/DL (ref 65–99)
HCT VFR BLD AUTO: 45.9 % (ref 37.5–51)
HGB BLD-MCNC: 16.9 G/DL (ref 13–17.7)
IMM GRANULOCYTES # BLD AUTO: 0.02 10*3/MM3 (ref 0–0.05)
IMM GRANULOCYTES NFR BLD AUTO: 0.3 % (ref 0–0.5)
INR PPP: 0.93 (ref 0.91–1.09)
LYMPHOCYTES # BLD AUTO: 1.83 10*3/MM3 (ref 0.7–3.1)
LYMPHOCYTES NFR BLD AUTO: 26.6 % (ref 19.6–45.3)
MCH RBC QN AUTO: 31.3 PG (ref 26.6–33)
MCHC RBC AUTO-ENTMCNC: 36.8 G/DL (ref 31.5–35.7)
MCV RBC AUTO: 85 FL (ref 79–97)
MONOCYTES # BLD AUTO: 0.33 10*3/MM3 (ref 0.1–0.9)
MONOCYTES NFR BLD AUTO: 4.8 % (ref 5–12)
NEUTROPHILS NFR BLD AUTO: 4.62 10*3/MM3 (ref 1.7–7)
NEUTROPHILS NFR BLD AUTO: 67.1 % (ref 42.7–76)
NRBC BLD AUTO-RTO: 0 /100 WBC (ref 0–0.2)
PLATELET # BLD AUTO: 204 10*3/MM3 (ref 140–450)
PMV BLD AUTO: 10.3 FL (ref 6–12)
POTASSIUM SERPL-SCNC: 4.2 MMOL/L (ref 3.5–5.2)
PROT SERPL-MCNC: 6.8 G/DL (ref 6–8.5)
PROTHROMBIN TIME: 12.8 SECONDS (ref 11.8–14.8)
RBC # BLD AUTO: 5.4 10*6/MM3 (ref 4.14–5.8)
SODIUM SERPL-SCNC: 138 MMOL/L (ref 136–145)
WBC NRBC COR # BLD AUTO: 6.88 10*3/MM3 (ref 3.4–10.8)

## 2024-10-02 PROCEDURE — 93010 ELECTROCARDIOGRAM REPORT: CPT | Performed by: HOSPITALIST

## 2024-10-02 PROCEDURE — 25510000001 IOPAMIDOL PER 1 ML: Performed by: NURSE PRACTITIONER

## 2024-10-02 PROCEDURE — 99285 EMERGENCY DEPT VISIT HI MDM: CPT

## 2024-10-02 PROCEDURE — 70450 CT HEAD/BRAIN W/O DYE: CPT

## 2024-10-02 PROCEDURE — 80053 COMPREHEN METABOLIC PANEL: CPT | Performed by: NURSE PRACTITIONER

## 2024-10-02 PROCEDURE — 85610 PROTHROMBIN TIME: CPT | Performed by: NURSE PRACTITIONER

## 2024-10-02 PROCEDURE — 85025 COMPLETE CBC W/AUTO DIFF WBC: CPT | Performed by: NURSE PRACTITIONER

## 2024-10-02 PROCEDURE — 71275 CT ANGIOGRAPHY CHEST: CPT

## 2024-10-02 PROCEDURE — 93005 ELECTROCARDIOGRAM TRACING: CPT | Performed by: NURSE PRACTITIONER

## 2024-10-02 PROCEDURE — 85730 THROMBOPLASTIN TIME PARTIAL: CPT | Performed by: NURSE PRACTITIONER

## 2024-10-02 RX ORDER — ALPRAZOLAM 0.5 MG
0.5 TABLET ORAL 2 TIMES DAILY PRN
Qty: 7 TABLET | Refills: 1 | Status: SHIPPED | OUTPATIENT
Start: 2024-10-02 | End: 2024-10-03 | Stop reason: SDUPTHER

## 2024-10-02 RX ORDER — IOPAMIDOL 755 MG/ML
100 INJECTION, SOLUTION INTRAVASCULAR
Status: COMPLETED | OUTPATIENT
Start: 2024-10-02 | End: 2024-10-02

## 2024-10-02 RX ORDER — SODIUM CHLORIDE 0.9 % (FLUSH) 0.9 %
10 SYRINGE (ML) INJECTION AS NEEDED
Status: DISCONTINUED | OUTPATIENT
Start: 2024-10-02 | End: 2024-10-02 | Stop reason: HOSPADM

## 2024-10-02 RX ADMIN — IOPAMIDOL 100 ML: 755 INJECTION, SOLUTION INTRAVENOUS at 10:54

## 2024-10-02 NOTE — DISCHARGE INSTRUCTIONS
No abnormality noted on CT of the head or CTA of the chest. PE has now resolved.     Continue medication that was previously prescribed  Decrease life stressors; continue xanax as prescribed PRN anxiety  F/u with pcp for re-evaluation

## 2024-10-02 NOTE — ED PROVIDER NOTES
Subjective   History of Present Illness  Patient is a 44-year-old male who presents to the ER with complaints of fatigue.  Patient was diagnosed with a pulmonary embolus on August 1, 2024.  Per review of CTA he had a very low clot burden with 2 tiny right lower pulmonary emboli, no active lung disease.  Patient was started on Eliquis.  He states he is having worsening anxiety which is the same symptom he had when he was diagnosed with PE.  He states his brother passed away last August from a pulmonary embolus and he is uncertain if it is simply anxiety or worsening PE.  He has had mild shortness of breath as well that he was contributing to anxiety.  He reports fatigue.  Patient is on Xanax as needed for history of anxiety due to PE.  He has followed up with hematology and states that his workup was negative.  He also complains of worsening headache symptoms and dizziness and was uncertain if this had any relation to his PE.  Patient denies any chest pain.  Due to worsening symptoms he came to the ER for evaluation and treatment.  Past medical history significant for headaches, hypertension, neck pain, PE        Review of Systems   Constitutional:  Positive for fatigue. Negative for fever.   HENT: Negative.  Negative for congestion.    Eyes:  Positive for photophobia.   Respiratory:  Positive for shortness of breath. Negative for cough.    Cardiovascular: Negative.  Negative for chest pain.   Gastrointestinal: Negative.  Negative for abdominal pain, constipation, diarrhea, nausea and vomiting.   Genitourinary: Negative.  Negative for dysuria.   Musculoskeletal: Negative.  Negative for back pain.   Skin: Negative.  Negative for rash.   Neurological:  Positive for dizziness.   Psychiatric/Behavioral:  The patient is nervous/anxious.    All other systems reviewed and are negative.      Past Medical History:   Diagnosis Date    Headache     Hypertension 12/6/18    Neck pain        No Known Allergies    Past Surgical  History:   Procedure Laterality Date    COLONOSCOPY N/A 03/23/2018    Procedure: COLONOSCOPY WITH ANESTHESIA;  Surgeon: Tomas Mendez MD;  Location: [facility] ENDOSCOPY;  Service: Gastroenterology    HEMORRHOIDECTOMY  2024    NO PAST SURGERIES         Family History   Problem Relation Age of Onset    Diabetes Mother     Stroke Mother     Clotting disorder Mother     Irregular heart beat Mother     Pulmonary embolism Mother     Stomach cancer Maternal Grandfather     Irregular heart beat Father     Heart disease Father     Alcohol abuse Paternal Grandfather     Colon cancer Neg Hx     Colon polyps Neg Hx        Social History     Socioeconomic History    Marital status:    Tobacco Use    Smoking status: Former     Current packs/day: 1.00     Types: Cigarettes     Passive exposure: Past    Smokeless tobacco: Never   Vaping Use    Vaping status: Never Used   Substance and Sexual Activity    Alcohol use: Yes     Alcohol/week: 2.0 standard drinks of alcohol     Types: 2 Cans of beer per week     Comment: occ    Drug use: No    Sexual activity: Yes     Partners: Female     Birth control/protection: Condom           Objective   Physical Exam  Vitals and nursing note reviewed.   Constitutional:       General: He is not in acute distress.     Appearance: Normal appearance. He is well-developed. He is not diaphoretic.   HENT:      Head: Atraumatic.      Right Ear: External ear normal.      Left Ear: External ear normal.      Nose: Nose normal.      Mouth/Throat:      Pharynx: Oropharynx is clear.   Eyes:      General: No scleral icterus.     Extraocular Movements: Extraocular movements intact.      Conjunctiva/sclera: Conjunctivae normal.   Neck:      Thyroid: No thyromegaly.      Vascular: No JVD.   Cardiovascular:      Rate and Rhythm: Normal rate and regular rhythm.      Pulses: Normal pulses.      Heart sounds: Normal heart sounds. No murmur heard.  Pulmonary:      Effort: Pulmonary effort is normal. No  respiratory distress.      Breath sounds: Normal breath sounds. No wheezing or rales.   Chest:      Chest wall: No tenderness.   Abdominal:      General: Bowel sounds are normal. There is no distension.      Palpations: Abdomen is soft. There is no mass.      Tenderness: There is no abdominal tenderness. There is no guarding or rebound.   Musculoskeletal:         General: Normal range of motion.      Cervical back: Normal range of motion and neck supple.   Lymphadenopathy:      Cervical: No cervical adenopathy.   Skin:     General: Skin is warm and dry.      Coloration: Skin is not pale.      Findings: No erythema or rash.   Neurological:      Mental Status: He is alert and oriented to person, place, and time.      Cranial Nerves: No cranial nerve deficit.      Coordination: Coordination normal.      Deep Tendon Reflexes: Reflexes are normal and symmetric.   Psychiatric:         Mood and Affect: Mood normal.         Thought Content: Thought content normal.         Judgment: Judgment normal.      Comments: Mildly anxious on exam         Procedures           ED Course                                             Medical Decision Making  Patient is a 44-year-old male who presents to the ER with complaints of fatigue.  Patient was diagnosed with a pulmonary embolus on August 1, 2024.  Per review of CTA he had a very low clot burden with 2 tiny right lower pulmonary emboli, no active lung disease.  Patient was started on Eliquis.  He states he is having worsening anxiety which is the same symptom he had when he was diagnosed with PE.  He states his brother passed away last August from a pulmonary embolus and he is uncertain if it is simply anxiety or worsening PE.  He has had mild shortness of breath as well that he was contributing to anxiety.  He reports fatigue.  Patient is on Xanax as needed for history of anxiety due to PE.  He has followed up with hematology and states that his workup was negative.  He also  complains of worsening headache symptoms and dizziness and was uncertain if this had any relation to his PE.  Patient denies any chest pain.  Due to worsening symptoms he came to the ER for evaluation and treatment.  Past medical history significant for headaches, hypertension, neck pain, PE    Differential diagnosis: Anxiety, worsening pulmonary embolus, migrainous headache, and other    Labs Reviewed  COMPREHENSIVE METABOLIC PANEL - Abnormal; Notable for the following components:     Glucose                       111 (*)             All other components within normal limits         Narrative: GFR Normal >60                  Chronic Kidney Disease <60                  Kidney Failure <15                    CBC WITH AUTO DIFFERENTIAL - Abnormal; Notable for the following components:     MCHC                          36.8 (*)               Monocyte %                    4.8 (*)             All other components within normal limits  APTT - Normal  PROTIME-INR - Normal  CBC AND DIFFERENTIAL     CT Angiogram Chest   Final Result         1.  No evidence of pulmonary embolus.         2.  Lungs are clear.              This report was signed and finalized on 10/2/2024 12:28 PM by Dr. Leobardo Welsh MD.          CT Head Without Contrast   Final Result    1. No acute intracranial process.                   This report was signed and finalized on 10/2/2024 12:20 PM by Dr. Emil Mckee MD.         Patient presented with headache and an anxious feeling.  He was recently diagnosed with PE and currently taking Eliquis.  Patient's brother passed away last year from a pulmonary embolus.  He has underlying anxiety and currently taking Xanax.  Complains of a headache, denies any chest pain or significant shortness of breath however this anxious feeling that he had when he was diagnosed with PE.  Today labs are unremarkable.  CT of the head was negative for acute findings, CTA of the chest is negative, lungs are clear,  pulmonary embolus has resolved.  Patient will need to work on his underlying anxiety and continue Xanax as needed as prescribed by PCP.  He will need to continue his anticoagulation medication and have a close follow-up with PCP for reevaluation patient will be discharged home shortly in stable condition.    Problems Addressed:  Anxiety: acute illness or injury  History of pulmonary embolus (PE): acute illness or injury    Amount and/or Complexity of Data Reviewed  Labs: ordered. Decision-making details documented in ED Course.  Radiology: ordered. Decision-making details documented in ED Course.  ECG/medicine tests: ordered. Decision-making details documented in ED Course.    Risk  Prescription drug management.        Final diagnoses:   Anxiety   History of pulmonary embolus (PE)       ED Disposition  ED Disposition       ED Disposition   Discharge    Condition   Good    Comment   --               No follow-up provider specified.       Where to Get Your Medications        These medications were sent to [facility]/pharmacy #[id number] - [place], [place] - [address] - [phone]  - [phone]   [address] [place] [id number]      Phone: [phone]   ALPRAZolam 0.5 MG tablet          Medication List      No changes were made to your prescriptions during this visit.            Sofia Lim, APRN  10/02/24 0223

## 2024-10-03 ENCOUNTER — OFFICE VISIT (OUTPATIENT)
Dept: FAMILY MEDICINE CLINIC | Facility: CLINIC | Age: 44
End: 2024-10-03
Payer: COMMERCIAL

## 2024-10-03 VITALS
BODY MASS INDEX: 32.06 KG/M2 | WEIGHT: 229 LBS | SYSTOLIC BLOOD PRESSURE: 122 MMHG | RESPIRATION RATE: 18 BRPM | OXYGEN SATURATION: 99 % | DIASTOLIC BLOOD PRESSURE: 82 MMHG | TEMPERATURE: 97.9 F | HEIGHT: 71 IN | HEART RATE: 97 BPM

## 2024-10-03 DIAGNOSIS — F41.9 ANXIETY: Primary | ICD-10-CM

## 2024-10-03 LAB
QT INTERVAL: 340 MS
QTC INTERVAL: 427 MS

## 2024-10-03 PROCEDURE — 99214 OFFICE O/P EST MOD 30 MIN: CPT | Performed by: FAMILY MEDICINE

## 2024-10-03 RX ORDER — ALPRAZOLAM 0.5 MG
0.5 TABLET ORAL 2 TIMES DAILY PRN
Qty: 30 TABLET | Refills: 1 | Status: SHIPPED | OUTPATIENT
Start: 2024-10-03

## 2024-10-07 ENCOUNTER — TELEPHONE (OUTPATIENT)
Dept: ONCOLOGY | Facility: CLINIC | Age: 44
End: 2024-10-07
Payer: COMMERCIAL

## 2024-10-07 NOTE — PROGRESS NOTES
"Chief Complaint  Anxiety (Pt states has been having an anxiety feeling and has not been able to sleep that night when feeling that way) and Hospital Follow Up Visit (Pt went to ED on 10/02/2024 for the following symptoms )    Subjective        Danny Ponce presents to [facility]  Anxiety      Struggling with persistent anxiety that resulted in a ED follow-up on 10/2/2024, interested in long-term therapy.    Objective   Vital Signs:  /82   Pulse 97   Temp 97.9 °F (36.6 °C)   Resp 18   Ht 180.3 cm (71\")   Wt 104 kg (229 lb)   SpO2 99%   BMI 31.94 kg/m²   Estimated body mass index is 31.94 kg/m² as calculated from the following:    Height as of this encounter: 180.3 cm (71\").    Weight as of this encounter: 104 kg (229 lb).            Physical Exam  Vitals and nursing note reviewed.   Constitutional:       General: He is not in acute distress.     Appearance: He is not diaphoretic.   HENT:      Head: Normocephalic and atraumatic.      Nose: Nose normal.   Eyes:      General: No scleral icterus.        Right eye: No discharge.         Left eye: No discharge.      Conjunctiva/sclera: Conjunctivae normal.   Neck:      Trachea: No tracheal deviation.   Pulmonary:      Effort: Pulmonary effort is normal.   Skin:     General: Skin is warm and dry.      Coloration: Skin is not pale.   Neurological:      Mental Status: He is alert and oriented to person, place, and time.   Psychiatric:         Behavior: Behavior normal.         Thought Content: Thought content normal.         Judgment: Judgment normal.        Result Review :                Assessment and Plan   Diagnoses and all orders for this visit:    1. Anxiety (Primary)  -     sertraline (Zoloft) 50 MG tablet; Take 1 tablet by mouth Daily.  Dispense: 90 tablet; Refill: 0  -     Ambulatory Referral to Psychology  -     ALPRAZolam (Xanax) 0.5 MG tablet; Take 1 tablet by mouth 2 (Two) Times a Day As Needed for Anxiety.  " Dispense: 30 tablet; Refill: 1    Uncontrolled         Follow Up   Return in about 6 weeks (around 11/14/2024).  Patient was given instructions and counseling regarding his condition or for health maintenance advice. Please see specific information pulled into the AVS if appropriate.

## 2024-10-07 NOTE — TELEPHONE ENCOUNTER
----- Message from Gia Ventura sent at 10/6/2024 12:17 PM CDT -----  Please let the patient know that his clotting disorder labs done when he visit with me, all came back unremarkable.  Per our discussion, if the labs came back negative, then he will stop blood thinners, hence he could proceed with stopping it now.  ANNIE  amanuel you

## 2024-10-14 ENCOUNTER — LAB (OUTPATIENT)
Dept: LAB | Facility: HOSPITAL | Age: 44
End: 2024-10-14
Payer: COMMERCIAL

## 2024-10-14 ENCOUNTER — OFFICE VISIT (OUTPATIENT)
Dept: FAMILY MEDICINE CLINIC | Facility: CLINIC | Age: 44
End: 2024-10-14
Payer: COMMERCIAL

## 2024-10-14 VITALS
DIASTOLIC BLOOD PRESSURE: 80 MMHG | RESPIRATION RATE: 18 BRPM | SYSTOLIC BLOOD PRESSURE: 110 MMHG | HEART RATE: 80 BPM | HEIGHT: 71 IN | TEMPERATURE: 97 F | WEIGHT: 221 LBS | BODY MASS INDEX: 30.94 KG/M2 | OXYGEN SATURATION: 98 %

## 2024-10-14 DIAGNOSIS — R20.0 NUMBNESS AND TINGLING: Primary | ICD-10-CM

## 2024-10-14 DIAGNOSIS — R73.9 HYPERGLYCEMIA: ICD-10-CM

## 2024-10-14 DIAGNOSIS — R20.2 NUMBNESS AND TINGLING: Primary | ICD-10-CM

## 2024-10-14 DIAGNOSIS — R20.2 NUMBNESS AND TINGLING: ICD-10-CM

## 2024-10-14 DIAGNOSIS — G47.33 OSA (OBSTRUCTIVE SLEEP APNEA): ICD-10-CM

## 2024-10-14 DIAGNOSIS — R00.2 PALPITATIONS: ICD-10-CM

## 2024-10-14 DIAGNOSIS — R20.0 NUMBNESS AND TINGLING: ICD-10-CM

## 2024-10-14 LAB
CRP SERPL-MCNC: <0.3 MG/DL (ref 0–0.5)
ERYTHROCYTE [SEDIMENTATION RATE] IN BLOOD: <1 MM/HR (ref 0–15)
HBA1C MFR BLD: 4.2 % (ref 4.8–5.6)

## 2024-10-14 PROCEDURE — 85652 RBC SED RATE AUTOMATED: CPT

## 2024-10-14 PROCEDURE — 83036 HEMOGLOBIN GLYCOSYLATED A1C: CPT

## 2024-10-14 PROCEDURE — 36415 COLL VENOUS BLD VENIPUNCTURE: CPT

## 2024-10-14 PROCEDURE — 99214 OFFICE O/P EST MOD 30 MIN: CPT | Performed by: FAMILY MEDICINE

## 2024-10-14 PROCEDURE — 86038 ANTINUCLEAR ANTIBODIES: CPT

## 2024-10-14 PROCEDURE — 86140 C-REACTIVE PROTEIN: CPT

## 2024-10-14 NOTE — PROGRESS NOTES
"Chief Complaint  Numbness (Pt states is waking up nightly left and right arms are numb along with left and right arms. ) and Headache    Subjective        Danny Ponce presents to [facility]  History of Present Illness  [place]:  Sitting and Reading: 3  Watching TV: 3  Sitting, inactive in a public place: 0  As a passenger in a car for an hour without a break: 0  Lying down to rest in the afternoon: 3  Sitting and talking to someone: 0  Sitting quietly after a lunch without alcohol: 0  In a car, while stopped for a few minutes in traffic: 0  Total: 9    Struggling with L arm and b/l distal legs going numb in the night. Struggles with excessive somnolence. Has HA at times, when he wakes up at night he feels like his heart is not beating hard enough to work. Says that blood sugars have been in the 130s in the AM    Objective   Vital Signs:  /80   Pulse 80   Temp 97 °F (36.1 °C)   Resp 18   Ht 180.3 cm (71\")   Wt 100 kg (221 lb)   SpO2 98%   BMI 30.82 kg/m²   Estimated body mass index is 30.82 kg/m² as calculated from the following:    Height as of this encounter: 180.3 cm (71\").    Weight as of this encounter: 100 kg (221 lb).        Physical Exam  Vitals and nursing note reviewed.   Constitutional:       General: He is not in acute distress.     Appearance: He is not diaphoretic.   HENT:      Head: Normocephalic and atraumatic.      Nose: Nose normal.   Eyes:      General: No scleral icterus.        Right eye: No discharge.         Left eye: No discharge.      Conjunctiva/sclera: Conjunctivae normal.   Neck:      Trachea: No tracheal deviation.   Pulmonary:      Effort: Pulmonary effort is normal.   Skin:     General: Skin is warm and dry.      Coloration: Skin is not pale.   Neurological:      Mental Status: He is alert and oriented to person, place, and time.   Psychiatric:         Mood and Affect: Mood is anxious.         Behavior: Behavior normal.         " Thought Content: Thought content normal.         Judgment: Judgment normal.        Result Review :                Assessment and Plan   Diagnoses and all orders for this visit:    1. Numbness and tingling (Primary)  -     EDILBERTO by IFA, Reflex 9-biomarkers profile; Future  -     Sedimentation rate, automated; Future  -     C-reactive protein; Future    2. Palpitations  -     Holter Monitor - 72 Hour Up To 15 Days; Future    3. VERENA (obstructive sleep apnea)  -     Home Sleep Study; Future    4. Hyperglycemia  -     Hemoglobin A1c; Future             Follow Up   Return if symptoms worsen or fail to improve.  Patient was given instructions and counseling regarding his condition or for health maintenance advice. Please see specific information pulled into the AVS if appropriate.

## 2024-10-15 ENCOUNTER — TELEPHONE (OUTPATIENT)
Dept: FAMILY MEDICINE CLINIC | Facility: CLINIC | Age: 44
End: 2024-10-15
Payer: COMMERCIAL

## 2024-10-15 DIAGNOSIS — G47.00 INSOMNIA, UNSPECIFIED TYPE: Primary | ICD-10-CM

## 2024-10-15 LAB
ANA SER QL IF: NEGATIVE
LABORATORY COMMENT REPORT: NORMAL

## 2024-10-15 NOTE — TELEPHONE ENCOUNTER
Caller: Danny Ponce    Relationship: Self    Best call back number: [phone]    What is the best time to reach you: SOON PLEASE     Who are you requesting to speak with (clinical staff, provider,  specific staff member): PROVIDER OR CLINICAL STAFF       What was the call regarding: PATIENT REQUESTING A CALL BACK TO DISCUSS NOT BEING ABLE TO SLEEP SINCE STARTING THE sertraline (Zoloft) 50 MG tablet PATIENT TAKES MEDICATION OF THE MORNING       Is it okay if the provider responds through MyChart: PREFERS A CALL BACK

## 2024-10-17 RX ORDER — TRAZODONE HYDROCHLORIDE 50 MG/1
50 TABLET, FILM COATED ORAL NIGHTLY
Qty: 90 TABLET | Refills: 0 | Status: SHIPPED | OUTPATIENT
Start: 2024-10-17

## 2024-10-22 ENCOUNTER — TELEPHONE (OUTPATIENT)
Dept: ONCOLOGY | Facility: CLINIC | Age: 44
End: 2024-10-22
Payer: COMMERCIAL

## 2024-10-22 ENCOUNTER — HOSPITAL ENCOUNTER (OUTPATIENT)
Dept: CARDIOLOGY | Facility: HOSPITAL | Age: 44
Discharge: HOME | End: 2024-10-22
Admitting: FAMILY MEDICINE
Payer: COMMERCIAL

## 2024-10-22 DIAGNOSIS — R00.2 PALPITATIONS: ICD-10-CM

## 2024-10-22 PROCEDURE — 93246 EXT ECG>7D<15D RECORDING: CPT

## 2024-10-22 NOTE — TELEPHONE ENCOUNTER
Pt is aware he can start 81 mg asa daily. He will meet with dr jara to discuss  any concerns he may have.

## 2024-10-22 NOTE — TELEPHONE ENCOUNTER
Caller: Danny Ponce    Relationship: Self    Best call back number: [phone]    What was the call regarding: PATIENT WAS LAST SEEN ON 9/27/2024. IS IT OKAY FOR HIM TO STOP TAKING THE ELIQUIS (HAS BEEN OFF OF IT FOR ABOUT A WEEK)? REALLY CONCERNED ABOUT FUTURE BLOOD CLOTS, CAN HE TAKE A LOW DOSE ASPIRIN INSTEAD?    CALL TO ADVISE

## 2024-10-23 NOTE — PROGRESS NOTES
Clinic Progress Note    Patient:  Danny Ponce  YOB: 1980  Date of Service: 2024  MRN: [id number]   Acct:    Primary Care Physician: Jimbo Mays DO    Televisit    Chief Complaint: PE     Hematology History:  Mr. Ponce is a 44 y.o. male with a past medical history of hypertension headache, who presents for further evaluation and discussion about acute PE detected on 2024.       The patient presented to his PCP office having chest pain, therefore D-dimer was done that was elevated, hence he underwent CTA-chest  on 2024 that showed 2 tiny right lower lobe pulmonary emboli, very low clot burden.  He was started on Eliquis.     He has a family history of PE in his brother, which he  from at age 44, sister had a PE in her 30s while on [facility] OCP. Mom had blood clots with PFO. He thinks his mom had either protein S or C deficiency.      The patient underwent partial thrombophilia workup on 2024, including factor 5 Leiden, protein S, C and antithrombin 3 activity levels, which all were unremarkable. Remaining thrombophilia workup obtained on 2004 including cardiolipin antibodies, lupus anticoagulant, beta 2 glycoprotein antibodies also came back unremarkable.    Interval History:  Mr. Ponce met through televisit.  In the interim since I saw him last, he completed 3 months of anticoagulation and was transitioned to aspirin.  He feels fairly well today.  He denies having any fever, chills, chest pain, shortness of breath, abdominal pain, nausea vomiting, change in bowel habits, weakness or numbness arms or legs.    Objectives:  GENERAL: Alert and oriented, no apparent distress  HEENT: No scleral icterus  NECK: Supple  SKIN: No jaundice  PSYCHIATRIC: Full affect    Results:  Lab Results   Component Value Date    WBC 6.88 10/02/2024    HGB 16.9 10/02/2024    HCT 45.9 10/02/2024    MCV 85.0 10/02/2024     10/02/2024     Assessment :  Mr. Ponce is a 44  y.o. male with a past medical history of hypertension headache, who presents for further evaluation and discussion about acute PE detected on 08/01/2024.      Plan:   - The patient seems to have an unprovoked PE; he had a recent road trip but he stopped and walked off and throughout that 5-hour trip. He is not on testosterone, though he has family history of clots.  - As for the length of anticoagulation, we discussed that although his blood clot is unprovoked, but the fact that it is of a small burden and it is the first time ever he gets a blood clot, it would be reasonable to stop anticoagulation after 3 months of treatment.  - The patient underwent partial thrombophilia workup on 08/06/2024, including factor 5 Leiden, protein S, C and antithrombin 3 activity levels, which all were unremarkable. Remaining thrombophilia workup obtained on 09/27/2024 including cardiolipin antibodies, lupus anticoagulant, beta 2 glycoprotein antibodies also came back unremarkable.  - As the clotting disorder tests all came back negative, we discussed the approach of stopping anticoagulation, be vigilant about symptoms, and continuing aspirin as a preventative method; we discussed that aspirin could be inferior to anticoagulation, but better than taking nothing.    Gia Ventura MD  11/1/2024

## 2024-11-01 ENCOUNTER — TELEMEDICINE (OUTPATIENT)
Dept: ONCOLOGY | Facility: CLINIC | Age: 44
End: 2024-11-01
Payer: COMMERCIAL

## 2024-11-01 DIAGNOSIS — I26.99 OTHER ACUTE PULMONARY EMBOLISM WITHOUT ACUTE COR PULMONALE: Primary | ICD-10-CM

## 2024-11-15 ENCOUNTER — OFFICE VISIT (OUTPATIENT)
Dept: FAMILY MEDICINE CLINIC | Facility: CLINIC | Age: 44
End: 2024-11-15
Payer: COMMERCIAL

## 2024-11-15 VITALS
WEIGHT: 230 LBS | RESPIRATION RATE: 20 BRPM | BODY MASS INDEX: 32.2 KG/M2 | SYSTOLIC BLOOD PRESSURE: 110 MMHG | TEMPERATURE: 97.4 F | DIASTOLIC BLOOD PRESSURE: 61 MMHG | HEART RATE: 83 BPM | HEIGHT: 71 IN | OXYGEN SATURATION: 98 %

## 2024-11-15 DIAGNOSIS — Z00.00 ANNUAL PHYSICAL EXAM: Primary | ICD-10-CM

## 2024-11-15 DIAGNOSIS — F41.9 ANXIETY: ICD-10-CM

## 2024-11-15 RX ORDER — ASPIRIN 81 MG/1
81 TABLET, CHEWABLE ORAL DAILY
COMMUNITY

## 2024-11-18 ENCOUNTER — HOSPITAL ENCOUNTER (OUTPATIENT)
Dept: SLEEP CENTER | Age: 44
Discharge: HOME | End: 2024-11-20
Payer: COMMERCIAL

## 2024-11-18 LAB
CV ZIO BASELINE AVG BPM: 75 BPM
CV ZIO BASELINE BPM HIGH: 180 BPM
CV ZIO BASELINE BPM LOW: 46 BPM
CV ZIO DEVICE ANALYSIS TIME: NORMAL
CV ZIO ECT SVE COUNT: 93 EPISODES
CV ZIO ECT SVE CPLT COUNT: 11 EPISODES
CV ZIO ECT SVE CPLT FREQ: NORMAL
CV ZIO ECT SVE FREQ: NORMAL
CV ZIO ECT SVE TPLT COUNT: 6 EPISODES
CV ZIO ECT SVE TPLT FREQ: NORMAL
CV ZIO ECT VE COUNT: 86 EPISODES
CV ZIO ECT VE CPLT COUNT: 0 EPISODES
CV ZIO ECT VE CPLT FREQ: 0
CV ZIO ECT VE FREQ: NORMAL
CV ZIO ECT VE TPLT COUNT: 0 EPISODES
CV ZIO ECT VE TPLT FREQ: 0
CV ZIO ECTOPIC SVE COUPLET RAW PERCENT: 0 %
CV ZIO ECTOPIC SVE ISOLATED PERCENT: 0.02 %
CV ZIO ECTOPIC SVE TRIPLET RAW PERCENT: 0 %
CV ZIO ECTOPIC VE COUPLET RAW PERCENT: 0 %
CV ZIO ECTOPIC VE ISOLATED PERCENT: 0.02 %
CV ZIO ECTOPIC VE TRIPLET RAW PERCENT: 0 %
CV ZIO ENROLLMENT END: NORMAL
CV ZIO ENROLLMENT START: NORMAL
CV ZIO PATIENT EVENTS DIARIES: 0
CV ZIO PATIENT EVENTS TRIGGERS: 5
CV ZIO PAUSE COUNT: 0
CV ZIO PRESCRIPTION STATUS: NORMAL
CV ZIO SVT AVG BPM: 103 BPM
CV ZIO SVT BPM HIGH: 113 BPM
CV ZIO SVT BPM LOW: 96 BPM
CV ZIO SVT COUNT: 1
CV ZIO SVT F EPI AVG BPM: 103 BPM
CV ZIO SVT F EPI BEATS: 4 BEATS
CV ZIO SVT F EPI BPM HIGH: 113 BPM
CV ZIO SVT F EPI BPM LOW: 96 BPM
CV ZIO SVT F EPI DUR: 2.4 SEC
CV ZIO SVT F EPI END: NORMAL
CV ZIO SVT F EPI START: NORMAL
CV ZIO SVT L EPI AVG BPM: 103 BPM
CV ZIO SVT L EPI BEATS: 4 BEATS
CV ZIO SVT L EPI BPM HIGH: 113 BPM
CV ZIO SVT L EPI BPM LOW: 96 BPM
CV ZIO SVT L EPI DUR: 2.4 SEC
CV ZIO SVT L EPI END: NORMAL
CV ZIO SVT L EPI START: NORMAL
CV ZIO TOTAL  ENROLLMENT PERIOD: NORMAL
CV ZIO VT COUNT: 0

## 2024-11-18 PROCEDURE — G0399 HOME SLEEP TEST/TYPE 3 PORTA: HCPCS

## 2024-11-18 NOTE — PROGRESS NOTES
Pt in the office to  HST monitor.  Pt was educated on how to use equipment properly and instructed to wear for 2 nights and to return on Wednesday.  Pt states he understood.

## 2024-11-19 PROCEDURE — G0399 HOME SLEEP TEST/TYPE 3 PORTA: HCPCS

## 2024-11-29 NOTE — PROGRESS NOTES
"Chief Complaint  Annual Exam and Anxiety (1 month follow up on anxiety)    Subjective        Danny Ponce presents to [facility]  Anxiety      Here for annual exam  Feels better than last visit  Not taking anything for anxiety, after improvement following PE treatment anxiety is better per his report    Past Medical History:   Diagnosis Date    Headache     Hypertension 12/6/18    Neck pain      Past Surgical History:   Procedure Laterality Date    COLONOSCOPY N/A 03/23/2018    Procedure: COLONOSCOPY WITH ANESTHESIA;  Surgeon: Tomas Mendez MD;  Location: [facility] ENDOSCOPY;  Service: Gastroenterology    HEMORRHOIDECTOMY  2024    NO PAST SURGERIES       Current Outpatient Medications   Medication Instructions    aspirin 81 mg, Daily    multivitamin with minerals tablet tablet 1 tablet, Daily     No Known Allergies  Family History   Problem Relation Age of Onset    Diabetes Mother     Stroke Mother     Clotting disorder Mother     Irregular heart beat Mother     Pulmonary embolism Mother     Stomach cancer Maternal Grandfather     Irregular heart beat Father     Heart disease Father     Alcohol abuse Paternal Grandfather     Colon cancer Neg Hx     Colon polyps Neg Hx      Social History     Tobacco Use    Smoking status: Former     Current packs/day: 1.00     Types: Cigarettes     Passive exposure: Past    Smokeless tobacco: Never   Vaping Use    Vaping status: Never Used   Substance Use Topics    Alcohol use: Yes     Alcohol/week: 2.0 standard drinks of alcohol     Types: 2 Cans of beer per week     Comment: occ    Drug use: No       Objective   Vital Signs:  /61 (BP Location: Left arm, Patient Position: Sitting, Cuff Size: Adult)   Pulse 83   Temp 97.4 °F (36.3 °C) (Infrared)   Resp 20   Ht 180.3 cm (71\")   Wt 104 kg (230 lb)   SpO2 98%   BMI 32.08 kg/m²   Estimated body mass index is 32.08 kg/m² as calculated from the following:    Height as of this " "encounter: 180.3 cm (71\").    Weight as of this encounter: 104 kg (230 lb).            Physical Exam  Constitutional:       General: He is not in acute distress.     Appearance: Normal appearance. He is not ill-appearing or diaphoretic.   HENT:      Head: Normocephalic and atraumatic.      Right Ear: Tympanic membrane and external ear normal.      Left Ear: Tympanic membrane and external ear normal.      Nose: Nose normal. No congestion or rhinorrhea.      Mouth/Throat:      Mouth: Mucous membranes are moist.      Pharynx: Oropharynx is clear. No oropharyngeal exudate or posterior oropharyngeal erythema.   Eyes:      General: No scleral icterus.        Right eye: No discharge.         Left eye: No discharge.      Extraocular Movements: Extraocular movements intact.      Conjunctiva/sclera: Conjunctivae normal.      Pupils: Pupils are equal, round, and reactive to light.   Neck:      Thyroid: No thyromegaly.      Vascular: No JVD.   Cardiovascular:      Rate and Rhythm: Normal rate and regular rhythm.      Pulses: Normal pulses.      Heart sounds: Normal heart sounds. No murmur heard.     No friction rub. No gallop.   Pulmonary:      Effort: Pulmonary effort is normal.      Breath sounds: Normal breath sounds.   Abdominal:      General: Bowel sounds are normal. There is no distension.      Palpations: Abdomen is soft. There is no mass.      Tenderness: There is no abdominal tenderness. There is no guarding or rebound.   Musculoskeletal:         General: No deformity or signs of injury.      Cervical back: Neck supple.      Right lower leg: No edema.      Left lower leg: No edema.   Lymphadenopathy:      Cervical: No cervical adenopathy.   Skin:     General: Skin is warm and dry.      Capillary Refill: Capillary refill takes less than 2 seconds.      Coloration: Skin is not jaundiced or pale.   Neurological:      Mental Status: He is alert and oriented to person, place, and time. Mental status is at baseline. "   Psychiatric:         Mood and Affect: Mood normal. Mood is anxious.         Behavior: Behavior normal.         Thought Content: Thought content normal.         Judgment: Judgment normal.        Result Review :                Assessment and Plan   Diagnoses and all orders for this visit:    1. Annual physical exam (Primary)    2. Anxiety    Preventative: flu vaccine declined  Still appears clinically anxious, clinical monitoring for now         Follow Up   Return in about 6 months (around 5/15/2025) for Recheck anxiety.  Patient was given instructions and counseling regarding his condition or for health maintenance advice. Please see specific information pulled into the AVS if appropriate.

## 2024-12-01 DIAGNOSIS — G47.33 OSA (OBSTRUCTIVE SLEEP APNEA): Primary | ICD-10-CM

## 2024-12-02 ENCOUNTER — FOLLOWUP TELEPHONE ENCOUNTER (OUTPATIENT)
Dept: SLEEP CENTER | Age: 44
End: 2024-12-02

## 2024-12-02 NOTE — TELEPHONE ENCOUNTER
Called and spoke to patient and discussed results of the home sleep study.  Explained order for auto cpap.  [facility]ap [facility].  Home sleep study reports were sent to the referring provider.

## 2024-12-04 ENCOUNTER — LAB (OUTPATIENT)
Dept: LAB | Facility: HOSPITAL | Age: 44
End: 2024-12-04
Payer: COMMERCIAL

## 2024-12-04 ENCOUNTER — OFFICE VISIT (OUTPATIENT)
Dept: FAMILY MEDICINE CLINIC | Facility: CLINIC | Age: 44
End: 2024-12-04
Payer: COMMERCIAL

## 2024-12-04 VITALS
OXYGEN SATURATION: 98 % | HEART RATE: 73 BPM | BODY MASS INDEX: 31.5 KG/M2 | DIASTOLIC BLOOD PRESSURE: 80 MMHG | SYSTOLIC BLOOD PRESSURE: 100 MMHG | TEMPERATURE: 98.1 F | WEIGHT: 225 LBS | HEIGHT: 71 IN

## 2024-12-04 DIAGNOSIS — M54.2 CHRONIC NECK PAIN: ICD-10-CM

## 2024-12-04 DIAGNOSIS — G89.29 CHRONIC NECK PAIN: ICD-10-CM

## 2024-12-04 DIAGNOSIS — R00.2 PALPITATIONS: ICD-10-CM

## 2024-12-04 DIAGNOSIS — I47.10 SVT (SUPRAVENTRICULAR TACHYCARDIA): ICD-10-CM

## 2024-12-04 DIAGNOSIS — R79.89 POSITIVE D DIMER: ICD-10-CM

## 2024-12-04 DIAGNOSIS — G47.33 OSA (OBSTRUCTIVE SLEEP APNEA): ICD-10-CM

## 2024-12-04 DIAGNOSIS — R42 DIZZINESS: ICD-10-CM

## 2024-12-04 DIAGNOSIS — R42 DIZZINESS: Primary | ICD-10-CM

## 2024-12-04 DIAGNOSIS — Z86.711 HISTORY OF PULMONARY EMBOLUS (PE): ICD-10-CM

## 2024-12-04 LAB — D DIMER PPP FEU-MCNC: 0.75 MCGFEU/ML (ref 0–0.5)

## 2024-12-04 PROCEDURE — 85379 FIBRIN DEGRADATION QUANT: CPT

## 2024-12-04 PROCEDURE — 36415 COLL VENOUS BLD VENIPUNCTURE: CPT

## 2024-12-04 PROCEDURE — 99214 OFFICE O/P EST MOD 30 MIN: CPT | Performed by: FAMILY MEDICINE

## 2024-12-04 RX ORDER — METOPROLOL TARTRATE 25 MG/1
25 TABLET, FILM COATED ORAL 2 TIMES DAILY
Qty: 60 TABLET | Refills: 1 | Status: SHIPPED | OUTPATIENT
Start: 2024-12-04

## 2024-12-04 NOTE — PROGRESS NOTES
"Chief Complaint  Headache, Dizziness, and Results (Patient would like to discuss sleep study results, holter monitor)    Subjective        Danny Ponce presents to [facility]  Headache    Symptoms include headaches.      F/u on sleep study results, mild VERENA on first night, none on second  Holter monitor had small run of SVT, otherwise benign study, occurred in early AM hrs  Usually wakes up between 3-4 AM for no apparent reason, denies snoring    Patient is still experiencing neck pain with only mild to modest relief with chiropractic as well as PT.  He is also having persistent dizziness and headache, says that the symptoms are similar to previous when he was diagnosed with pulmonary embolism.    Objective   Vital Signs:  /80 (BP Location: Right arm, Patient Position: Sitting, Cuff Size: Adult)   Pulse 73   Temp 98.1 °F (36.7 °C) (Temporal)   Ht 180.3 cm (71\")   Wt 102 kg (225 lb)   SpO2 98%   BMI 31.38 kg/m²   Estimated body mass index is 31.38 kg/m² as calculated from the following:    Height as of this encounter: 180.3 cm (71\").    Weight as of this encounter: 102 kg (225 lb).        Physical Exam  Vitals and nursing note reviewed.   Constitutional:       General: He is not in acute distress.     Appearance: He is not diaphoretic.   HENT:      Head: Normocephalic and atraumatic.      Nose: Nose normal.   Eyes:      General: No scleral icterus.        Right eye: No discharge.         Left eye: No discharge.      Conjunctiva/sclera: Conjunctivae normal.   Neck:      Trachea: No tracheal deviation.   Pulmonary:      Effort: Pulmonary effort is normal.   Skin:     General: Skin is warm and dry.      Coloration: Skin is not pale.   Neurological:      Mental Status: He is alert and oriented to person, place, and time.   Psychiatric:         Mood and Affect: Mood is anxious.         Behavior: Behavior normal.         Thought Content: Thought content normal.         " Judgment: Judgment normal.        Result Review :                Assessment and Plan   Diagnoses and all orders for this visit:    1. Dizziness (Primary)  -     D-dimer, Quantitative; Future  -     Cancel: CT Angiogram Chest With Contrast; Future  -     CT Angiogram Chest With Contrast; Future  -     MRI Brain Without Contrast; Future  -     MRI Angiogram Neck With Contrast; Future  -     MRI Angiogram Head With Contrast; Future    2. VERENA (obstructive sleep apnea)    3. Palpitations  -     metoprolol tartrate (LOPRESSOR) 25 MG tablet; Take 1 tablet by mouth 2 (Two) Times a Day.  Dispense: 60 tablet; Refill: 1    4. SVT (supraventricular tachycardia)  -     metoprolol tartrate (LOPRESSOR) 25 MG tablet; Take 1 tablet by mouth 2 (Two) Times a Day.  Dispense: 60 tablet; Refill: 1    5. Positive D dimer  -     Cancel: CT Angiogram Chest With Contrast; Future  -     CT Angiogram Chest With Contrast; Future  -     MRI Brain Without Contrast; Future  -     MRI Angiogram Neck With Contrast; Future  -     MRI Angiogram Head With Contrast; Future    6. History of pulmonary embolus (PE)  -     Cancel: CT Angiogram Chest With Contrast; Future  -     CT Angiogram Chest With Contrast; Future    7. Chronic neck pain  -     MRI Brain Without Contrast; Future  -     MRI Angiogram Neck With Contrast; Future  -     MRI Angiogram Head With Contrast; Future    Initially ordered metoprolol tartrate to use at bedtime to see if SVT runs are causing enough distress to provide sleep disturbance.  D-dimer was ordered at the end of the visit which came back elevated at 0.75 and the patient has discontinued anticoagulation.  His symptoms are similar to previous PE so we will order stat CT chest imaging, given the fact that his dizziness could be due to other vascular abnormalities of head and neck MRI imaging was ordered above for further investigation.         Follow Up   Return if symptoms worsen or fail to improve.  Patient was given  instructions and counseling regarding his condition or for health maintenance advice. Please see specific information pulled into the AVS if appropriate.

## 2024-12-05 ENCOUNTER — TELEPHONE (OUTPATIENT)
Dept: FAMILY MEDICINE CLINIC | Facility: CLINIC | Age: 44
End: 2024-12-05
Payer: COMMERCIAL

## 2024-12-05 NOTE — TELEPHONE ENCOUNTER
Patient called stating he saw his D-Dimer results on mychart and it shows it is elevated. Patient states he was advised by the physician to call if it was elevated to discuss possibly getting a scan completed. Patient reports his dizziness is getting worse and still having increased headaches.    Please advise, thanks!

## 2024-12-06 ENCOUNTER — HOSPITAL ENCOUNTER (OUTPATIENT)
Dept: CT IMAGING | Facility: HOSPITAL | Age: 44
Discharge: HOME | End: 2024-12-06
Admitting: FAMILY MEDICINE
Payer: COMMERCIAL

## 2024-12-06 DIAGNOSIS — M54.2 NECK PAIN: ICD-10-CM

## 2024-12-06 DIAGNOSIS — R42 DIZZINESS: ICD-10-CM

## 2024-12-06 DIAGNOSIS — Z86.711 HISTORY OF PULMONARY EMBOLUS (PE): ICD-10-CM

## 2024-12-06 DIAGNOSIS — R79.89 POSITIVE D-DIMER: Primary | ICD-10-CM

## 2024-12-06 DIAGNOSIS — R79.89 POSITIVE D DIMER: ICD-10-CM

## 2024-12-06 PROCEDURE — 71275 CT ANGIOGRAPHY CHEST: CPT

## 2024-12-06 PROCEDURE — 25510000001 IOPAMIDOL PER 1 ML: Performed by: FAMILY MEDICINE

## 2024-12-06 RX ORDER — IOPAMIDOL 755 MG/ML
100 INJECTION, SOLUTION INTRAVASCULAR
Status: COMPLETED | OUTPATIENT
Start: 2024-12-06 | End: 2024-12-06

## 2024-12-06 RX ADMIN — IOPAMIDOL 100 ML: 755 INJECTION, SOLUTION INTRAVENOUS at 13:37

## 2024-12-12 ENCOUNTER — TELEPHONE (OUTPATIENT)
Dept: ONCOLOGY | Facility: CLINIC | Age: 44
End: 2024-12-12

## 2024-12-12 NOTE — TELEPHONE ENCOUNTER
Caller: Danny Ponce    Relationship to patient: Self    Best call back number: [phone]    Chief complaint: RESCHEDULE     Type of visit: LAB AND FOLLOW UP     Requested date: SOONER APPOINTMENT FOR VIDEO VISIT     If rescheduling, when is the original appointment: 1-17     Additional notes:PLEASE ADVISE

## 2024-12-21 ENCOUNTER — HOSPITAL ENCOUNTER (OUTPATIENT)
Dept: MRI IMAGING | Facility: HOSPITAL | Age: 44
Discharge: HOME | End: 2024-12-21
Payer: COMMERCIAL

## 2024-12-21 DIAGNOSIS — G89.29 CHRONIC NECK PAIN: ICD-10-CM

## 2024-12-21 DIAGNOSIS — R42 DIZZINESS: ICD-10-CM

## 2024-12-21 DIAGNOSIS — R79.89 POSITIVE D DIMER: ICD-10-CM

## 2024-12-21 DIAGNOSIS — M54.2 CHRONIC NECK PAIN: ICD-10-CM

## 2024-12-21 PROCEDURE — 70551 MRI BRAIN STEM W/O DYE: CPT

## 2024-12-23 ENCOUNTER — NURSE TRIAGE (OUTPATIENT)
Dept: CALL CENTER | Facility: HOSPITAL | Age: 44
End: 2024-12-23
Payer: COMMERCIAL

## 2024-12-24 ENCOUNTER — TELEMEDICINE (OUTPATIENT)
Dept: FAMILY MEDICINE CLINIC | Facility: TELEHEALTH | Age: 44
End: 2024-12-24
Payer: COMMERCIAL

## 2024-12-24 DIAGNOSIS — U07.1 COVID-19 VIRUS INFECTION: Primary | ICD-10-CM

## 2024-12-24 PROCEDURE — 99213 OFFICE O/P EST LOW 20 MIN: CPT | Performed by: NURSE PRACTITIONER

## 2024-12-24 NOTE — TELEPHONE ENCOUNTER
"Reason for Disposition   Health Information question, no triage required and triager able to answer question    Additional Information   Negative: [1] Caller is not with the adult (patient) AND [2] reporting urgent symptoms   Negative: Lab result questions   Negative: Medication questions   Negative: Caller can't be reached by phone   Negative: Caller has already spoken to PCP or another triager   Negative: RN needs further essential information from caller in order to complete triage   Negative: Requesting regular office appointment   Negative: [1] Caller requesting NON-URGENT health information AND [2] PCP's office is the best resource    Answer Assessment - Initial Assessment Questions  1. REASON FOR CALL or QUESTION: \"What is your reason for calling today?\" or \"How can I best help you?\" or \"What question do you have that I can help answer?\"      Caller had positive Covid test 2 days ago and normally wears home cpap machine at , wants to know if it is ok to continue using device. Advised caller that the biggest issue with use of CPAP is spreading disease due to aerosolization of virus. Advised caller to have spouse sleep in different room for the next week while symptoms present and using CPAP and to also use with door closed an to practice good cleaning techniques and sterilization of door knobs and high traffic touch areas daily to keep room disinfected and decrease possible spread to other family members. Verbalizes understanding.    Protocols used: Information Only Call - No Triage-ADULT-    "

## 2024-12-24 NOTE — PROGRESS NOTES
You have chosen to receive care through a telehealth visit.  Do you consent to use a video/audio connection for your medical care today? Yes     Patient or patient representative verbalized consent for the use of Ambient Listening during the visit with  JOSSY Cervantes for chart documentation. 12/24/2024  14:12 EST    CHIEF COMPLAINT  No chief complaint on file.        HPI  Danny Ponce is a 44 y.o. male  presents with complaint of    History of Present Illness  The patient is a 44-year-old male who presents via virtual visit with complaints of testing positive for COVID-19.    He tested positive for COVID-19 on 12/20/2024, with symptoms beginning as a scratchy throat the previous night. He experienced fevers and chills into the early morning of 12/20/2024. He reports intermittent coughing, mild cold symptoms, and occasional production of yellow mucus. He also notes head pressure and fatigue but reports no significant congestion. He expresses concern about his lung health, describing a sensation of increasing heaviness. He has been monitoring his oxygen levels at home, which have remained in the high 90s. He has been using a saline nasal rinse to alleviate the head pressure.    Supplemental Information  He has no known liver or kidney issues. He has a history of a small blood clot in his right lower lung, the cause of which remains undetermined. He has had issues with blood pressure, but it is currently stable.    IMMUNIZATIONS  He has not been vaccinated for COVID-19.       Review of Systems  See HPI    Past Medical History:   Diagnosis Date    Headache     Hypertension 12/6/18    Neck pain        Family History   Problem Relation Age of Onset    Diabetes Mother     Stroke Mother     Clotting disorder Mother     Irregular heart beat Mother     Pulmonary embolism Mother     Stomach cancer Maternal Grandfather     Irregular heart beat Father     Heart disease Father     Alcohol abuse Paternal  Grandfather     Colon cancer Neg Hx     Colon polyps Neg Hx        Social History     Socioeconomic History    Marital status:    Tobacco Use    Smoking status: Former     Current packs/day: 1.00     Types: Cigarettes     Passive exposure: Past    Smokeless tobacco: Never   Vaping Use    Vaping status: Never Used   Substance and Sexual Activity    Alcohol use: Yes     Alcohol/week: 2.0 standard drinks of alcohol     Types: 2 Cans of beer per week     Comment: occ    Drug use: No    Sexual activity: Yes     Partners: Female     Birth control/protection: Condom       Danny Ponce  reports that he has quit smoking. His smoking use included cigarettes. He has been exposed to tobacco smoke. He has never used smokeless tobacco.             There were no vitals taken for this visit.    PHYSICAL EXAM  Physical Exam   Constitutional: He is oriented to person, place, and time. He appears well-developed and well-nourished. He does not have a sickly appearance. He does not appear ill.   HENT:   Head: Normocephalic and atraumatic.   Pulmonary/Chest: Effort normal.  No respiratory distress.  Neurological: He is alert and oriented to person, place, and time.           Diagnoses and all orders for this visit:    1. COVID-19 virus infection (Primary)  -     Nirmatrelvir & Ritonavir, 300mg/100mg, (PAXLOVID); Take 3 tablets by mouth 2 (Two) Times a Day for 5 days.  Dispense: 30 tablet; Refill: 0    --take medications as prescribed  --increase fluids, rest as needed, tylenol or ibuprofen for pain  --f/u in 5-7 days if no improvement      Assessment & Plan  1. COVID-19 infection.  He tested positive for COVID-19 on the 20th and began experiencing symptoms on the 19th, including a scratchy throat, fevers, chills, and head pressure. He reports occasional coughing and yellow mucus, likely due to head drainage. His oxygen levels are in the high 90s. A prescription for an antiviral medication, to be taken as 3 tablets twice  daily for 5 days, will be sent to [facility] in [place] on [facility]. He is advised to monitor for signs of pneumonia, such as increased coughing, low-grade fever, chest pain, or coughing up discolored mucus. If symptoms worsen or do not improve, he should seek further medical attention either through another visit or at an urgent care facility.         FOLLOW-UP  As discussed during visit with PCP/[facility] if no improvement or Urgent Care/Emergency Department if worsening of symptoms    Patient verbalizes understanding of medication dosage, comfort measures, instructions for treatment and follow-up.    Neha Mars, APRN  12/24/2024  14:12 EST    Mode of Visit: Video  Location of patient: -HOME-  Location of provider: +HOME+  You have chosen to receive care through a telehealth visit.  The patient has signed the video visit consent form.  The visit included audio and video interaction. No technical issues occurred during this visit.      Note Disclaimer: At [facility], we believe that sharing information builds trust and better   relationships. You are receiving this note because you recently visited [facility]. It is possible you   will see health information before a provider has talked with you about it. This kind of information can   be easy to misunderstand. To help you fully understand what it means for your health, we urge you to   discuss this note with your provider.

## 2024-12-24 NOTE — PATIENT INSTRUCTIONS
"What is COVID-19?   COVID-19 stands for \"coronavirus disease 2019.\" It is caused by a virus called SARS-CoV-2. The virus first appeared in late 2019 and quickly spread around the world.  There are different \"variants,\" or strains, of the virus that causes COVID-19. Some variants seem to spread more easily than the original virus. Certain variants might also make people sicker than others. In the [place], most cases of COVID-19 are from the \"Omicron\" variants.  The virus that causes COVID-19 mainly spreads from person to person. This usually happens when an infected person coughs, sneezes, or talks near other people.  A person can be infected, and spread the virus to others, even without having any symptoms.  What are the symptoms of COVID-19?   Symptoms usually start 3 to 5 days after a person is infected with the virus. But in some people, it can take up to 2 weeks for symptoms to appear. Some people never show symptoms at all.  When symptoms do happen, they can include:  ?Fever  ?Cough  ?Trouble breathing  ?Feeling tired  ?Shaking chills  ?Muscle aches  ?Headache  ?Sore throat  ?Runny or stuffy nose  ?Problems with sense of smell or taste  Many people only have mild cold symptoms. Some people have digestive problems, like nausea or diarrhea. There have also been some reports of rashes or other skin symptoms.  For most people, symptoms get better within a few days to weeks. But a small number of people get very sick and stop being able to breathe on their own. In severe cases, their organs stop working, which can lead to death.  Some people with COVID-19 continue to have some symptoms for weeks or months. This seems to be more likely in people who are sick enough to need to stay in the hospital. But this can also happen in people who did not get very sick.  Am I at risk for getting seriously ill?   It depends on your age, your health, and whether you have been vaccinated. In some people, COVID-19 leads to serious " "problems like pneumonia, which can cause a person to not get enough oxygen. It can also lead to heart problems, or even death. This risk gets higher as people get older. It is also higher in people who have other health problems like serious heart disease, chronic kidney disease, type 2 diabetes, chronic obstructive pulmonary disease (\"COPD\"), sickle cell disease, or obesity. People who have a weak immune system for other reasons (for example, HIV infection or certain medicines), asthma, cystic fibrosis, type 1 diabetes, or high blood pressure might also be at higher risk for serious problems.  Getting vaccinated makes people much less likely to get seriously ill with COVID-19.  Is there a test for the virus that causes COVID-19?   Yes. If you think that you might have COVID-19, get tested. This involves taking a swab from inside your nose or mouth. Some tests use a saliva sample. These tests can help you or your doctor figure out if you have COVID-19 or another illness.  There are 2 types of tests used to diagnose COVID-19:  ?Molecular tests - These look for the genetic material from the virus. They are also called \"nucleic acid tests\" or \"PCR tests.\" You can get a molecular test at a doctor's office, clinic, or pharmacy. Depending on the lab, it can take up to several days to get test results back. You can also buy molecular tests to use at home.  Molecular tests are the best way to know if a person has COVID-19. That's because they can detect even very low levels of virus in the body.  ?Antigen tests - These look for proteins from the virus. They can give results faster than most molecular tests. You can buy antigen tests to use at home. You can also get an antigen test at a doctor's office, clinic, or pharmacy.  Antigen tests are not as accurate as molecular tests. They are more likely to give \"false-negative\" results. This is when the test comes back negative even though the person actually is infected. If a " "person has symptoms or knows they were exposed to the virus, experts recommend \"repeat testing.\" This means getting tested again a few days later if an antigen test is negative.  There is also a blood test that can show if a person has had COVID-19 in the past. This is called an \"antibody\" test. Antibody tests are generally not used on their own to diagnose COVID-19 or make decisions about care. But public health experts can use them to learn how many people in a certain area were infected without knowing it.  What should I do if I get COVID-19?   If you have COVID-19, stay home, rest, and drink plenty of fluids. You can also take acetaminophen (sample brand name: Tylenol) to relieve fever and aches. If this does not help, you can try medicines like ibuprofen (sample brand names: Advil, Motrin).  If you go to a walk-in clinic or a hospital because of your symptoms, tell someone right away why you are there. The staff might ask you to wear a mask or to wait someplace where you are less likely to spread your infection.  Whether or not you see a doctor or nurse, stay home while you are sick with COVID-19. Do not go to work or school until your fever has been gone for at least 24 hours without taking medicine such as acetaminophen.  If your breathing symptoms get worse, call your doctor or nurse for advice. If you think that you are having a medical emergency, call for an ambulance (in the [place] and [place], call 9-1-1).  If I have COVID-19, do I need treatment?   It depends on your age, health, and symptoms. Most people with mild COVID-19 can rest at home until they get better. \"Mild\" means that you might have symptoms like fever, cough, or other cold symptoms, but you do not have trouble breathing. It often takes about 2 weeks for symptoms to improve, but it's not the same for everyone.  Doctors do recommend treatment for people who are at risk for getting seriously ill, even if their symptoms are mild. This " "includes:  ?Adults 65 years or older  ?Adults who have certain health conditions - Examples include a weaker than normal immune system, diabetes, serious heart or lung disease, chronic kidney disease, and obesity.  ?Adults 50 years or older who have not been vaccinated  If you are not sure if you fit into any of these categories, ask your doctor or nurse about treatment. They can talk to you about the risks and benefits.  How is COVID-19 treated?   For people who get treatment for mild COVID-19, the medicine most often used is an \"antiviral\" called nirmatrelvir-ritonavir (brand name: Paxlovid). This can lower your risk of getting sicker.  If your doctor suggests this treatment, it's important to know:  ?Paxlovid comes as several pills that you take for 5 days.  ?Treatment should be started within 5 days after symptoms begin. This is why it's important to test early so you know if you have COVID-19 as soon as possible.  ?Before prescribing Paxlovid, your doctor should review any other medicines and supplements that you take. In some cases, they might want to change or stop your other medicines while you take Paxlovid.  Some people who are treated with Paxlovid get something called \"viral rebound.\" This means that they start testing negative after having COVID-19, but then test positive again. Symptoms might also come back, though they are almost always mild. If you are at risk for serious illness, the benefits of treatment are still greater than the risk of viral rebound.  For people who cannot take Paxlovid, other options might include:  ?A different antiviral medicine -- One of these, remdesivir, is given by IV. There is also another antiviral pill, called molnupiravir, but this might not work as well as the other medicines. Some experts do not recommend molnupiravir.  ?Convalescent plasma - This treatment involves getting plasma that was donated from a person who has had COVID-19 in the past. Plasma is the liquid " "part of blood, and is given by IV. The donated plasma contains \"antibodies\" that can help fight the virus. Convalescent plasma might be an option in some cases, but it is not available everywhere.  If you have more severe illness with trouble breathing and low oxygen levels, or if you have other health problems, you might need to stay in the hospital. Some people need to be in the intensive care unit (also called the \"ICU\"). In the hospital, the staff can monitor and support your breathing and other body functions and make you as comfortable as possible. They can give treatments such as extra oxygen and medicines. If you are very sick, you might need to be put on a breathing machine, called a \"ventilator.\"  Can COVID-19 be prevented?   The best way to prevent COVID-19 is to get vaccinated. In the , people age 6 months and older can get a vaccine.  In addition to vaccination, there are other things you can do to help protect yourself and others. You can:  ?Wash your hands often (figure 1).  ?Consider wearing a face mask in some situations (figure 2). Masks can help protect both the wearer and others around them.  ?Stay home when you are sick. Try to avoid close contact with other people.  ?Cover your mouth and nose with the inside of your elbow when you cough or sneeze.  ?If someone in your home is sick, regularly clean things that are touched a lot. This includes counters, bedside tables, doorknobs, computers, phones, and bathroom surfaces.  ?Make sure that there is good ventilation (air flow) in your home. When possible, open windows to let fresh air in.  Experts recommend \"layering\" these strategies (figure 3). This means doing more than 1 of the things above to protect yourself, especially at times when lots of people are sick.  Where can I go to learn more?   You can find more information about COVID-19 at the following websites:  ?[place] Centers for Disease Control and Prevention (\"CDC\"): " www.cdc.gov/COVID19  This topic retrieved from [employer] on: Aug 09, 2024.      Here are the facts about Paxlovid. Please review and acknowledge.      Authorized Use     The FDA has authorized the emergency use of PAXLOVID, an investigational medicine, for the treatment of mild-to-moderate COVID-19 in adults and children (12 years of age and older weighing at least 88 pounds [40 kg]) with a positive test for the virus that causes COVID-19, and who are at high risk for progression to severe COVID-19, including hospitalization or death, under an EUA.     PAXLOVID is investigational because it is still being studied. There is limited information about the safety and effectiveness of using PAXLOVID to treat people with mild-to-moderate COVID-19.  FACT SHEET FOR PATIENTS, PARENTS, AND CAREGIVERS  EMERGENCY USE AUTHORIZATION (EUA) OF PAXLOVID  FOR CORONAVIRUS DISEASE 2019 (COVID-19)  You are being given this Fact Sheet because your healthcare provider believes it is   necessary to provide you with PAXLOVID for the treatment of mild-to-moderate   coronavirus disease (COVID-19) caused by the SARS-CoV-2 virus. This Fact Sheet   contains information to help you understand the risks and benefits of taking the   PAXLOVID you have received or may receive.   The [place] Food and Drug Administration (FDA) has issued an Emergency Use   Authorization (EUA) to make PAXLOVID available during the COVID-19 pandemic (for   more details about an EUA please see “What is an Emergency Use Authorization?”   at the end of this document). PAXLOVID is not an FDA-approved medicine in the   [place]. Read this Fact Sheet for information about PAXLOVID. Talk to your   healthcare provider about your options or if you have any questions. It is your choice to   take PAXLOVID.     What is COVID-19?  COVID-19 is caused by a virus called a coronavirus. You can get COVID-19 through   close contact with another person who has the virus.   COVID-19  illnesses have ranged from very mild-to-severe, including illness resulting in   death. While information so far suggests that most COVID-19 illness is mild, serious   illness can happen and may cause some of your other medical conditions to become   worse. Older people and people of all ages with severe, long lasting (chronic) medical   conditions like heart disease, lung disease, and diabetes, for example seem to be at   higher risk of being hospitalized for COVID-19.     What is PAXLOVID?  PAXLOVID is an investigational medicine used to treat mild-to-moderate COVID-19 in   adults and children [12 years of age and older weighing at least 88 pounds (40 kg)] with   positive results of direct SARS-CoV-2 viral testing, and who are at high risk for   progression to severe COVID-19, including hospitalization or death. PAXLOVID is   investigational because it is still being studied. There is limited information about the   safety and effectiveness of using PAXLOVID to treat people with mild-to-moderate   COVID-19.    The FDA has authorized the emergency use of PAXLOVID for the treatment of mild-tomoderate COVID-19 in adults and children [12 years of age and older weighing at least   88 pounds (40 kg)] with a positive test for the virus that causes COVID-19, and who are   at high risk for progression to severe COVID-19, including hospitalization or death,   under an EUA.  What should I tell my healthcare provider before I take PAXLOVID?  Tell your healthcare provider if you:   Have any allergies   Have liver or kidney disease   Are pregnant or plan to become pregnant   Are breastfeeding a child   Have any serious illnesses    Tell your healthcare provider about all the medicines you take, including   prescription and over-the-counter medicines, vitamins, and herbal supplements.   Some medicines may interact with PAXLOVID and may cause serious side effects.   Keep a list of your medicines to show your healthcare provider  and pharmacist when   you get a new medicine.   You can ask your healthcare provider or pharmacist for a list of medicines that interact   with PAXLOVID. Do not start taking a new medicine without telling your   healthcare provider. Your healthcare provider can tell you if it is safe to take   PAXLOVID with other medicines.   Tell your healthcare provider if you are taking combined hormonal contraceptive.  PAXLOVID may affect how your birth control pills work. Females who are able to   become pregnant should use another effective alternative form of contraception or an   additional barrier method of contraception. Talk to your healthcare provider if you have   any questions about contraceptive methods that might be right for you.    How do I take PAXLOVID?    PAXLOVID consists of 2 medicines: nirmatrelvir and ritonavir.  o Take 2 pink tablets of nirmatrelvir with 1 white tablet of ritonavir by mouth  2 times each day (in the morning and in the evening) for 5 days. For each  dose, take all 3 tablets at the same time.  o If you have kidney disease, talk to your healthcare provider. You  may need a different dose.   Swallow the tablets whole. Do not chew, break, or crush the tablets.   Take PAXLOVID with or without food.   Do not stop taking PAXLOVID without talking to your healthcare provider, even if  you feel better.   If you miss a dose of PAXLOVID within 8 hours of the time it is usually taken,  take it as soon as you remember. If you miss a dose by more than 8 hours, skip  the missed dose and take the next dose at your regular time. Do not take  2 doses of PAXLOVID at the same time.   If you take too much PAXLOVID, call your healthcare provider or go to the  nearest hospital emergency room right away.   If you are taking a ritonavir- or cobicistat-containing medicine to treat hepatitis C  or Human Immunodeficiency Virus (HIV), you should continue to take your  medicine as prescribed by your healthcare  provider.  3   Talk to your healthcare provider if you do not feel better or if you feel worse after   5 days.    Who should generally not take PAXLOVID?   Do not take PAXLOVID if:   You are allergic to nirmatrelvir, ritonavir, or any of the ingredients in PAXLOVID.   You are taking any of the following medicines:  o Alfuzosin  o Pethidine, piroxicam, propoxyphene  o Ranolazine  o Amiodarone, dronedarone, flecainide, propafenone, quinidine  o Colchicine  o Lurasidone, pimozide, clozapine  o Dihydroergotamine, ergotamine, methylergonovine  o Lovastatin, simvastatin  o Sildenafil (Revatio®) for pulmonary arterial hypertension (PAH)  o Triazolam, oral midazolam  o Apalutamide  o Carbamazepine, phenobarbital, phenytoin  o Rifampin  o Jing’s Wort (hypericum perforatum)  Taking PAXLOVID with these medicines may cause serious or life-threatening side   effects or affect how PAXLOVID works.     These are not the only medicines that may cause serious side effects if taken with   PAXLOVID. PAXLOVID may increase or decrease the levels of multiple other   medicines. It is very important to tell your healthcare provider about all of the medicines   you are taking because additional laboratory tests or changes in the dose of your other   medicines may be necessary while you are taking PAXLOVID. Your healthcare provider   may also tell you about specific symptoms to watch out for that may indicate that you   need to stop or decrease the dose of some of your other medicines.    What are the important possible side effects of PAXLOVID?   Possible side effects of PAXLOVID are:   Liver Problems. Tell your healthcare provider right away if you have any of  these signs and symptoms of liver problems: loss of appetite, yellowing of your  skin and the whites of eyes (jaundice), dark-colored urine, pale colored stools  and itchy skin, stomach area (abdominal) pain.   Resistance to HIV Medicines. If you have untreated HIV infection,  PAXLOVID  may lead to some HIV medicines not working as well in the future.  4    Other possible side effects include:  o altered sense of taste  o diarrhea  o high blood pressure  o muscle aches  These are not all the possible side effects of PAXLOVID. Not many people have taken  PAXLOVID. Serious and unexpected side effects may happen. PAXLOVID is still being   studied, so it is possible that all of the risks are not known at this time.    What other treatment choices are there?  Like PAXLOVID, FDA may allow for the emergency use of other medicines to treat   people with COVID-19. Go to https://www.fda.gov/emergency-preparedness-andresponse/mcm-legal-regulatory-and-policy-framework/emergency-use-authorization for   information on the emergency use of other medicines that are authorized by FDA to   treat people with COVID-19. Your healthcare provider may talk with you about clinical   trials for which you may be eligible.   It is your choice to be treated or not to be treated with PAXLOVID. Should you decide   not to receive it or for your child not to receive it, it will not change your standard   medical care.    What if I am pregnant or breastfeeding?   There is no experience treating pregnant women or breastfeeding mothers with   PAXLOVID. For a mother and unborn baby, the benefit of taking PAXLOVID may be   greater than the risk from the treatment. If you are pregnant, discuss your options and   specific situation with your healthcare provider.   It is recommended that you use effective barrier contraception or do not have sexual   activity while taking PAXLOVID.   If you are breastfeeding, discuss your options and specific situation with your   healthcare provider.     How do I report side effects with PAXLOVID?   Contact your healthcare provider if you have any side effects that bother you or do not   go away.   Report side effects to FDA MedWatch at www.fda.gov/medwatch or call 3-994-UWA8809 or you can  report side effects to [employer] at the contact information provided   below.  Website Fax number Telephone number  www.[employer] [phone]  5     How should I store PAXLOVID?   Store PAXLOVID tablets at room temperature, between 68?F to 77?F (20?C to 25?C).  How can I learn more about COVID-19?    Ask your healthcare provider.   Visit https://www.cdc.gov/COVID19.   Contact your local or state public health department.  What is an Emergency Use Authorization (EUA)?   The [place] FDA has made PAXLOVID available under an emergency access   mechanism called an Emergency Use Authorization (EUA). The EUA is supported by a    of Health and Human Service (HHS) declaration that circumstances exist to   justify the emergency use of drugs and biological products during the COVID-19   pandemic.     PAXLOVID for the treatment of mild-to-moderate COVID-19 in adults and children   [12 years of age and older weighing at least 88 pounds (40 kg)] with positive results of   direct SARS-CoV-2 viral testing, and who are at high risk for progression to severe   COVID-19, including hospitalization or death, has not undergone the same type of   review as an FDA-approved product. In issuing an EUA under the COVID-19 public   health emergency, the FDA has determined, among other things, that based on the   total amount of scientific evidence available including data from adequate and   well-controlled clinical trials, if available, it is reasonable to believe that the product may   be effective for diagnosing, treating, or preventing COVID-19, or a serious or   life-threatening disease or condition caused by COVID-19; that the known and potential   benefits of the product, when used to diagnose, treat, or prevent such disease or   condition, outweigh the known and potential risks of such product; and that there are no   adequate, approved, and available alternatives.    All of these  criteria must be met to allow for the product to be used in the treatment of   patients during the COVID-19 pandemic. The EUA for PAXLOVID is in effect for the   duration of the COVID-19 declaration justifying emergency use of this product, unless  terminated or revoked (after which the products may no longer be used under the   EUA).  6     Additional Information  For general questions, visit the website or call the telephone number provided below.   Website Telephone number  [email or web address]  [phone]  ([phone])  You can also go to [email or web address] or call [phone] for more   information.  [id number]   Revised: 12/22/2021      Alternatives Discussed Intro (Do Not Add Period): I discussed alternative treatments to Mohs surgery and specifically discussed the risks and benefits of

## 2024-12-27 DIAGNOSIS — R42 DIZZINESS: Primary | ICD-10-CM

## 2025-01-02 ENCOUNTER — OFFICE VISIT (OUTPATIENT)
Dept: FAMILY MEDICINE CLINIC | Facility: CLINIC | Age: 45
End: 2025-01-02
Payer: COMMERCIAL

## 2025-01-02 VITALS
WEIGHT: 222.2 LBS | HEART RATE: 78 BPM | SYSTOLIC BLOOD PRESSURE: 100 MMHG | DIASTOLIC BLOOD PRESSURE: 78 MMHG | OXYGEN SATURATION: 98 % | TEMPERATURE: 97.5 F | BODY MASS INDEX: 31.11 KG/M2 | HEIGHT: 71 IN

## 2025-01-02 DIAGNOSIS — F41.1 GAD (GENERALIZED ANXIETY DISORDER): ICD-10-CM

## 2025-01-02 DIAGNOSIS — R42 DIZZINESS: Primary | ICD-10-CM

## 2025-01-02 PROCEDURE — 99214 OFFICE O/P EST MOD 30 MIN: CPT | Performed by: FAMILY MEDICINE

## 2025-01-02 RX ORDER — VILAZODONE HYDROCHLORIDE 20 MG/1
20 TABLET ORAL DAILY
Qty: 30 TABLET | Refills: 2 | Status: SHIPPED | OUTPATIENT
Start: 2025-01-02

## 2025-01-02 NOTE — PROGRESS NOTES
"Chief Complaint  Results (Patient presents to clinic to discuss MRI results. ) and Ear Fullness (Patient reports having bilateral ear pressure L>R. Patient also experiencing some frontal facial sinus pressure. Patient reports having COVID during Indra break. )    Subjective        Danny Ponce presents to [facility]  Ear Fullness       Patient is here to discuss recent MRI findings without acute pathology other than some white matter changes thought to reflect potential small ischemic vessel disease.  He is still having dizziness, still having enough anxiety that he is having difficulty with small stressors, positive insomnia and has not got to start CPAP compliance due to recent COVID illness and some residual ear fullness and sinus congestion.  Review of systems positive for dry mouth    Objective   Vital Signs:  /78 (BP Location: Right arm, Patient Position: Sitting, Cuff Size: Adult)   Pulse 78   Temp 97.5 °F (36.4 °C) (Temporal)   Ht 180.3 cm (71\")   Wt 101 kg (222 lb 3.2 oz)   SpO2 98%   BMI 30.99 kg/m²   Estimated body mass index is 30.99 kg/m² as calculated from the following:    Height as of this encounter: 180.3 cm (71\").    Weight as of this encounter: 101 kg (222 lb 3.2 oz).            Physical Exam  Vitals and nursing note reviewed.   Constitutional:       General: He is not in acute distress.     Appearance: He is not diaphoretic.   HENT:      Head: Normocephalic and atraumatic.      Nose: Nose normal.   Eyes:      General: No scleral icterus.        Right eye: No discharge.         Left eye: No discharge.      Conjunctiva/sclera: Conjunctivae normal.   Neck:      Trachea: No tracheal deviation.   Pulmonary:      Effort: Pulmonary effort is normal.   Skin:     General: Skin is warm and dry.      Coloration: Skin is not pale.   Neurological:      Mental Status: He is alert and oriented to person, place, and time.   Psychiatric:         Mood and " Affect: Mood is anxious.         Behavior: Behavior normal.         Thought Content: Thought content normal.         Judgment: Judgment normal.        Result Review :                Assessment and Plan   Diagnoses and all orders for this visit:    1. Dizziness (Primary)    2. BERNA (generalized anxiety disorder)  -     Vilazodone HCl 10 & 20 MG kit; Take 40 mg by mouth See Admin Instructions. 10 mg PO daily x 7 days, then 10 mg PO daily  Dispense: 30 each; Refill: 0  -     vilazodone (VIIBRYD) 20 MG tablet tablet; Take 1 tablet by mouth Daily.  Dispense: 30 tablet; Refill: 2    Viibryd trial, okay to use Xanax as needed, also okay to use for sleep to help with CPAP compliance  Await MRA imaging of head and neck         Follow Up   Return if symptoms worsen or fail to improve.  Patient was given instructions and counseling regarding his condition or for health maintenance advice. Please see specific information pulled into the AVS if appropriate.

## 2025-01-09 DIAGNOSIS — R42 DIZZINESS: Primary | ICD-10-CM

## 2025-01-09 DIAGNOSIS — R79.89 POSITIVE D-DIMER: ICD-10-CM

## 2025-01-13 ENCOUNTER — TELEPHONE (OUTPATIENT)
Dept: ONCOLOGY | Facility: CLINIC | Age: 45
End: 2025-01-13

## 2025-01-13 NOTE — TELEPHONE ENCOUNTER
Caller: Danny Ponce    Relationship: Self    Best call back number: [phone]    What is the best time to reach you: ANYTIME    Who are you requesting to speak with (clinical staff, provider,  specific staff member): SCHEDULING    What was the call regarding: PT IS CALLING IN REGARDS TO HIS APPOINTMENTS ON 1-17, SCHEDULED FOR A VIDEO CALL AND A LAB WILL HE COME TO THE OFFICE FOR THE VIDEO CALL AND LAB    PLEASE ADVISE

## 2025-01-15 ENCOUNTER — HOSPITAL ENCOUNTER (OUTPATIENT)
Dept: CT IMAGING | Facility: HOSPITAL | Age: 45
Discharge: HOME | End: 2025-01-15
Admitting: FAMILY MEDICINE
Payer: COMMERCIAL

## 2025-01-15 DIAGNOSIS — R42 DIZZINESS: ICD-10-CM

## 2025-01-15 DIAGNOSIS — R79.89 POSITIVE D-DIMER: ICD-10-CM

## 2025-01-15 PROCEDURE — 70496 CT ANGIOGRAPHY HEAD: CPT

## 2025-01-15 PROCEDURE — 25510000001 IOPAMIDOL PER 1 ML: Performed by: FAMILY MEDICINE

## 2025-01-15 RX ORDER — IOPAMIDOL 755 MG/ML
100 INJECTION, SOLUTION INTRAVASCULAR
Status: COMPLETED | OUTPATIENT
Start: 2025-01-15 | End: 2025-01-15

## 2025-01-15 RX ADMIN — IOPAMIDOL 100 ML: 755 INJECTION, SOLUTION INTRAVENOUS at 10:23

## 2025-01-15 NOTE — PROGRESS NOTES
"Clinic Progress Note    Patient:  Danny Ponce  YOB: 1980  Date of Service: 1/15/2025  MRN: [id number]   Acct:    Primary Care Physician: Jimbo Mays DO    Televisit    Chief Complaint: PE     Hematology History:  Mr. Ponce is a 44 y.o. male with a past medical history of hypertension headache, who presents for further evaluation and discussion about acute PE detected on 2024.       The patient presented to his PCP office having chest pain, therefore D-dimer was done that was elevated, hence he underwent CTA-chest  on 2024 that showed 2 tiny right lower lobe pulmonary emboli, very low clot burden.  He was started on Eliquis.     He has a family history of PE in his brother, which he  from at age 44, sister had a PE in her 30s while on Jeanine OCP. Mom had blood clots with PFO. He thinks his mom had either protein S or C deficiency.     The patient underwent partial thrombophilia workup on 2024, including factor 5 Leiden, protein S, C and antithrombin 3 activity levels, which all were unremarkable. Remaining thrombophilia workup obtained on 2004 including cardiolipin antibodies, lupus anticoagulant, beta 2 glycoprotein antibodies also came back unremarkable.    Interval History:  Mr. Ponce met through televisit.  In the interim from last visit, he has been doing fairly well.  He feels well today and has no complaints.  He denies any fever, chills, chest pain, shortness of breath, abdominal pain, nausea or vomiting, change in bowel habits.    Objectives:  /68   Pulse 83   Temp 97.6 °F (36.4 °C)   Resp 16   Ht 180.3 cm (71\")   Wt 98.8 kg (217 lb 14.4 oz)   SpO2 97%   BMI 30.39 kg/m²   GENERAL: Alert and oriented, no apparent distress  HEENT: No scleral icterus  NECK: Supple  SKIN: No jaundice  PSYCHIATRIC: Full affect    Results:  Lab Results   Component Value Date    WBC 6.88 10/02/2024    HGB 16.9 10/02/2024    HCT 45.9 10/02/2024    MCV " 85.0 10/02/2024     10/02/2024     CTA-Chest 12/06/2024, impression:  1. No CT evidence of pulmonary embolus. Thoracic aorta normal caliber with no evidence of dissection.  2. No infiltrate or effusion.  3. A 6 mm right hepatic lobe cyst is stable compared to 8/1/2024.    Assessment :  Mr. Ponce is a 44 y.o. male with a past medical history of hypertension headache, who presents for further evaluation and discussion about acute PE detected on 08/01/2024.      Plan:   - The patient seems to have an unprovoked PE; he had a recent road trip but he stopped and walked off and throughout that 5-hour trip. He is not on testosterone, though he has family history of clots.  - As for the length of anticoagulation, we discussed that although his blood clot is unprovoked, but the fact that it is of a small burden and it is the first time ever he gets a blood clot, it would be reasonable to stop anticoagulation after 3 months of treatment.  - The patient underwent partial thrombophilia workup on 08/06/2024, including factor 5 Leiden, protein S, C and antithrombin 3 activity levels, which all were unremarkable. Remaining thrombophilia workup obtained on 09/27/2024 including cardiolipin antibodies, lupus anticoagulant, beta 2 glycoprotein antibodies also came back unremarkable.  - As the clotting disorder tests all came back negative, we discussed the approach of stopping anticoagulation, be vigilant about symptoms, and continuing aspirin as a preventative method; we discussed that aspirin could be inferior to anticoagulation, but better than taking nothing.  - I will refer him to vascular surgery for varicose veins.  - I would refer him to neurology for headaches and white matter changes on MRI-brain.    Gia Ventura MD  1/15/2025

## 2025-01-17 ENCOUNTER — OFFICE VISIT (OUTPATIENT)
Dept: ONCOLOGY | Facility: CLINIC | Age: 45
End: 2025-01-17
Payer: COMMERCIAL

## 2025-01-17 VITALS
TEMPERATURE: 97.6 F | WEIGHT: 217.9 LBS | OXYGEN SATURATION: 97 % | DIASTOLIC BLOOD PRESSURE: 68 MMHG | RESPIRATION RATE: 16 BRPM | SYSTOLIC BLOOD PRESSURE: 108 MMHG | HEIGHT: 71 IN | BODY MASS INDEX: 30.51 KG/M2 | HEART RATE: 83 BPM

## 2025-01-17 DIAGNOSIS — R51.9 HEADACHE DISORDER: ICD-10-CM

## 2025-01-17 DIAGNOSIS — I83.90 VARICOSE VEINS OF LOWER LEG: Primary | ICD-10-CM

## 2025-01-17 DIAGNOSIS — R93.89 ABNORMAL MRI: ICD-10-CM

## 2025-01-17 DIAGNOSIS — I26.99 OTHER ACUTE PULMONARY EMBOLISM WITHOUT ACUTE COR PULMONALE: Primary | ICD-10-CM

## 2025-01-23 ENCOUNTER — TELEPHONE (OUTPATIENT)
Dept: VASCULAR SURGERY | Facility: CLINIC | Age: 45
End: 2025-01-23
Payer: COMMERCIAL

## 2025-01-24 ENCOUNTER — PATIENT ROUNDING (BHMG ONLY) (OUTPATIENT)
Dept: VASCULAR SURGERY | Facility: CLINIC | Age: 45
End: 2025-01-24

## 2025-01-24 ENCOUNTER — OFFICE VISIT (OUTPATIENT)
Dept: VASCULAR SURGERY | Facility: CLINIC | Age: 45
End: 2025-01-24
Payer: COMMERCIAL

## 2025-01-24 VITALS
SYSTOLIC BLOOD PRESSURE: 124 MMHG | WEIGHT: 212 LBS | OXYGEN SATURATION: 98 % | HEIGHT: 71 IN | HEART RATE: 91 BPM | DIASTOLIC BLOOD PRESSURE: 72 MMHG | BODY MASS INDEX: 29.68 KG/M2

## 2025-01-24 DIAGNOSIS — I83.93 ASYMPTOMATIC VARICOSE VEINS OF BOTH LOWER EXTREMITIES: Primary | ICD-10-CM

## 2025-01-24 PROCEDURE — 99214 OFFICE O/P EST MOD 30 MIN: CPT | Performed by: NURSE PRACTITIONER

## 2025-01-24 NOTE — PROGRESS NOTES
2025      Gia Ventura MD  [address]  [address]  [place] [address]    Danny Pnoce  1980    Chief Complaint   Patient presents with    NEW PATIENT      Referred from Dr. Ventura for varicose veins. Patient states that since August he's had veins pop up in left leg mostly. Denies pain or swelling in them, just concerned about how fast they are coming. History of DVT in lung. Former occasional smoker in high school. No physical med list, reviewed verbally.        Dear Gia Ventura MD:      HPI  I had the pleasure of seeing your patient Danny Ponce in the office today.  Thank you kindly for this consultation.  As you recall, Danny Ponce is a 44 y.o.  male who you are currently following for pulmonary embolus.  This was found on 2024.  At that time there was no evidence of DVT to his lower extremities.  He does have a family history of pulmonary embolus in brother who  at age 44 and a sister who had a PE in her 30s.  Her notes he also thinks his mom had protein C or S  deficiency.  He is underwent workup which has been negative.  He has been having increased in varicose veins pop up since August mostly on the left leg.  He denies any pain or swelling but wanted a referral to get them checked.    Past Medical History:   Diagnosis Date    Headache     Hypertension 18    Neck pain        Past Surgical History:   Procedure Laterality Date    COLONOSCOPY N/A 2018    Procedure: COLONOSCOPY WITH ANESTHESIA;  Surgeon: Tomas Mendez MD;  Location: [facility] ENDOSCOPY;  Service: Gastroenterology    HEMORRHOIDECTOMY      NO PAST SURGERIES         Family History   Problem Relation Age of Onset    Diabetes Mother     Stroke Mother     Clotting disorder Mother     Irregular heart beat Mother     Pulmonary embolism Mother     Stomach cancer Maternal Grandfather     Irregular heart beat Father     Heart disease Father     Alcohol abuse  "Paternal Grandfather     Colon cancer Neg Hx     Colon polyps Neg Hx        Social History     Socioeconomic History    Marital status:    Tobacco Use    Smoking status: Former     Current packs/day: 1.00     Types: Cigarettes     Passive exposure: Past    Smokeless tobacco: Never   Vaping Use    Vaping status: Never Used   Substance and Sexual Activity    Alcohol use: Yes     Alcohol/week: 2.0 standard drinks of alcohol     Types: 2 Cans of beer per week     Comment: occ    Drug use: No    Sexual activity: Yes     Partners: Female     Birth control/protection: Condom       No Known Allergies    Current Outpatient Medications   Medication Instructions    aspirin 81 mg, Daily    MILK THISTLE EXTRACT PO 1 capsule, Daily    Misc Natural Products (Curcumax Pro) tablet Take  by mouth.    multivitamin with minerals tablet tablet 1 tablet, Daily    vilazodone (VIIBRYD) 20 mg, Oral, Daily         Review of Systems   Constitutional: Negative.    HENT: Negative.     Eyes: Negative.    Respiratory: Negative.     Cardiovascular: Negative.    Gastrointestinal: Negative.    Endocrine: Negative.    Genitourinary: Negative.    Musculoskeletal: Negative.    Skin: Negative.    Allergic/Immunologic: Negative.    Neurological: Negative.    Hematological: Negative.    Psychiatric/Behavioral: Negative.     All other systems reviewed and are negative.      /72   Pulse 91   Ht 180.3 cm (71\")   Wt 96.2 kg (212 lb)   SpO2 98%   BMI 29.57 kg/m²   Physical Exam  Constitutional:       General: He is not in acute distress.     Appearance: Normal appearance. He is well-developed. He is not diaphoretic.   HENT:      Head: Normocephalic and atraumatic.   Neck:      Vascular: No carotid bruit or JVD.   Cardiovascular:      Rate and Rhythm: Normal rate and regular rhythm.      Pulses: Normal pulses.           Femoral pulses are 2+ on the right side and 2+ on the left side.       Popliteal pulses are 2+ on the right side and 2+ on " the left side.        Dorsalis pedis pulses are 2+ on the right side and 2+ on the left side.        Posterior tibial pulses are 2+ on the right side and 2+ on the left side.      Heart sounds: Normal heart sounds, S1 normal and S2 normal. No murmur heard.     No friction rub. No gallop.      Comments: Varicose veins worse to the left leg  Pulmonary:      Effort: Pulmonary effort is normal.      Breath sounds: Normal breath sounds.   Abdominal:      General: Bowel sounds are normal. There is no abdominal bruit.      Palpations: Abdomen is soft.      Tenderness: There is no abdominal tenderness.   Musculoskeletal:         General: Normal range of motion.   Skin:     General: Skin is warm and dry.   Neurological:      Mental Status: He is alert and oriented to person, place, and time.      Cranial Nerves: No cranial nerve deficit.   Psychiatric:         Behavior: Behavior normal.         Thought Content: Thought content normal.         Judgment: Judgment normal.             Patient Active Problem List   Diagnosis    Obesity (BMI 30.0-34.9)    External hemorrhoid         ICD-10-CM ICD-9-CM   1. Asymptomatic varicose veins of both lower extremities  I83.93 454.9           Plan: After thoroughly evaluating Danny Ponce, I believe the best course of action is to remain conservative from vascular surgery standpoint.  Overall he is doing well and denies any symptoms of venous insufficiency.  He does have some increased varicosities.  He does report family history of varicose veins.  He is not very active.  I did recommend compression stockings on a daily basis and to keep his legs elevated when he is not on them.  There is no need for testing as he is overall not symptomatic.  He can follow-up here as needed or if decides he wants to proceed with testing I will be glad to order.  He will notify me if he like to proceed.  The patient can continue taking their current medication regimen as previously planned.  Body  mass index is 29.57 kg/m². .       This was all discussed in full with complete understanding.    Thank you for allowing me to participate in the care of your patient.  Please do not hesitate with any questions or concerns.  I will keep you aware of any further encounters with Danny Ponce.        Sincerely yours,         JOSSY Dorantes

## 2025-01-24 NOTE — PROGRESS NOTES
January 24, 2025    Hello, may I speak with Danny Ponce?    My name is CHARLEY      I am  with [facility]  [address], [address]  [place] [address] [phone].    Before we get started may I verify your date of birth? 1980    I am calling to officially welcome you to our practice and ask about your recent visit. Is this a good time to talk? yes    Tell me about your visit with us. What things went well?  EVERYTHING WENT WELL EVERYONE WAS VERY NICE       We're always looking for ways to make our patients' experiences even better. Do you have recommendations on ways we may improve?  no    Overall were you satisfied with your first visit to our practice? yes       I appreciate you taking the time to speak with me today. Is there anything else I can do for you? no      Thank you, and have a great day.

## 2025-02-05 DIAGNOSIS — F41.1 GAD (GENERALIZED ANXIETY DISORDER): ICD-10-CM

## 2025-02-05 RX ORDER — VILAZODONE HYDROCHLORIDE 20 MG/1
20 TABLET ORAL DAILY
Qty: 30 TABLET | Refills: 2 | OUTPATIENT
Start: 2025-02-05

## 2025-02-18 DIAGNOSIS — R93.89 ABNORMAL MRI: Primary | ICD-10-CM

## 2025-02-18 DIAGNOSIS — R51.9 HEADACHE DISORDER: ICD-10-CM

## 2025-03-11 ENCOUNTER — OFFICE VISIT (OUTPATIENT)
Dept: PULMONOLOGY | Age: 45
End: 2025-03-11
Payer: COMMERCIAL

## 2025-03-11 VITALS
HEART RATE: 82 BPM | SYSTOLIC BLOOD PRESSURE: 129 MMHG | DIASTOLIC BLOOD PRESSURE: 83 MMHG | OXYGEN SATURATION: 95 % | HEIGHT: 71 IN | WEIGHT: 222 LBS | RESPIRATION RATE: 20 BRPM | TEMPERATURE: 97.1 F | BODY MASS INDEX: 31.08 KG/M2

## 2025-03-11 DIAGNOSIS — F51.04 PSYCHOPHYSIOLOGICAL INSOMNIA: ICD-10-CM

## 2025-03-11 DIAGNOSIS — G47.10 HYPERSOMNIA: ICD-10-CM

## 2025-03-11 DIAGNOSIS — E66.9 OBESITY (BMI 30-39.9): ICD-10-CM

## 2025-03-11 DIAGNOSIS — F51.05 INSOMNIA DUE TO OTHER MENTAL DISORDER: ICD-10-CM

## 2025-03-11 DIAGNOSIS — G47.33 MILD OBSTRUCTIVE SLEEP APNEA: Primary | ICD-10-CM

## 2025-03-11 DIAGNOSIS — Z86.711 HISTORY OF PULMONARY EMBOLISM: ICD-10-CM

## 2025-03-11 DIAGNOSIS — F99 INSOMNIA DUE TO OTHER MENTAL DISORDER: ICD-10-CM

## 2025-03-11 PROCEDURE — 99204 OFFICE O/P NEW MOD 45 MIN: CPT | Performed by: INTERNAL MEDICINE

## 2025-03-11 RX ORDER — MULTIPLE VITAMINS W/ MINERALS TAB 9MG-400MCG
1 TAB ORAL DAILY
COMMUNITY

## 2025-03-11 RX ORDER — ASPIRIN 81 MG/1
81 TABLET, CHEWABLE ORAL DAILY
COMMUNITY

## 2025-03-11 ASSESSMENT — ENCOUNTER SYMPTOMS
COUGH: 0
BACK PAIN: 0
RHINORRHEA: 0
ABDOMINAL DISTENTION: 0
SHORTNESS OF BREATH: 0
ABDOMINAL PAIN: 0
APNEA: 1
WHEEZING: 0
ANAL BLEEDING: 0
CHEST TIGHTNESS: 0

## 2025-03-11 NOTE — PROGRESS NOTES
Pulmonary and Sleep Medicine    Cullen Leung (:  1980) is a 44 y.o. male,New patient, here for evaluation of the following chief complaint(s):  New Patient (NP- SADIA /Compliance report-3/7/2025./Pt states that he is struggling with with DME./CTA-10/02/2025)      Referring physician:  Scottie Estrella, Do  [address]  [address]  [place] [address]     ASSESSMENT/PLAN:  1. Mild obstructive sleep apnea.  Apnea-hypopnea index of 10 events per hour on home sleep study done on 2024  2. Insomnia due to other mental disorder  3. Psychophysiological insomnia  4. Obesity (BMI 30-39.9)  5. Hypersomnia  6. History of pulmonary embolism        Discussed CPAP compliance.  Continue current management with the CPAP.  Discussed sleep hygiene.  Discussed the fact that his symptoms of insomnia could be related to anxiety.  Will re-evaluate again in 3 months.          Iqra Vela MD, [facility], [facility]    Return in about 3 months (around 2025).    SUBJECTIVE/OBJECTIVE:        Patient is here for evaluation of obstructive sleep apnea.  He underwent home sleep study for the evaluation of insomnia.  The patient says that he has never been known to snore.  The sleep study was done due to the fact that the patient developed sleep maintenance insomnia suddenly.  He says he started waking up around 3:00 in the morning and his mind would race and he would not be able to fall asleep again.  He has been on CPAP.  His CPAP download data was reviewed.  My interpretation of the download is that he is using the CPAP on average little over 4 hours a night.  His apnea-hypopnea index while on the CPAP is less than 1 events per hour.  He continues to struggle with waking up through the night.  He apparently had a CTA in  that showed 2 small pulmonary emboli in the right lung.  Since then he was treated with Eliquis initially now he is on aspirin.  There was no defined etiology of his pulmonary emboli.  Most

## 2025-04-21 ENCOUNTER — OFFICE VISIT (OUTPATIENT)
Dept: NEUROLOGY | Age: 45
End: 2025-04-21
Payer: COMMERCIAL

## 2025-04-21 VITALS
SYSTOLIC BLOOD PRESSURE: 122 MMHG | BODY MASS INDEX: 31.08 KG/M2 | HEIGHT: 71 IN | DIASTOLIC BLOOD PRESSURE: 74 MMHG | HEART RATE: 68 BPM | OXYGEN SATURATION: 99 % | WEIGHT: 222 LBS

## 2025-04-21 DIAGNOSIS — Z86.711 HISTORY OF PULMONARY EMBOLUS (PE): ICD-10-CM

## 2025-04-21 DIAGNOSIS — R90.89 ABNORMAL FINDING ON MRI OF BRAIN: Primary | ICD-10-CM

## 2025-04-21 PROCEDURE — 99204 OFFICE O/P NEW MOD 45 MIN: CPT | Performed by: NURSE PRACTITIONER

## 2025-04-21 NOTE — PROGRESS NOTES
REVIEW OF SYSTEMS    Constitutional: []Fever []Sweat []Chills [] Recent Injury [x] Denies all unless marked  HEENT:[]Headache  [] Head Injury/Hearing Loss  [] Sore Throat  [] Ear Ache/Dizziness  [] Denies all unless marked  Spine:  [] Neck pain  [] Back pain  [] Sciaticia  [x] Denies all unless marked  Cardiovascular:[]Heart Disease []Chest Pain [] Palpitations  [x] Denies all unless marked  Pulmonary: []Shortness of Breath []Cough   [x] Denies all unless marke  Gastrointestinal: []Nausea  []Vomiting  []Abdominal Pain  []Constipation  []Diarrhea  []Dark Bloody Stools  [x] Denies all unless marked  Psychiatric/Behavioral:[] Depression [] Anxiety [x] Denies all unless marked  Genitourinary:   [] Frequency  [] Urgency  [] Incontinence [] Pain with Urination  [x] Denies all unless marked  Extremities: []Pain  []Swelling  [x] Denies all unless marked  Musculoskeletal: [] Muscle Pain  [] Joint Pain  [] Arthritis [] Muscle Cramps [] Muscle Twitches  [x] Denies all unless marked  Sleep: [x] Insomnia [] Snoring [] Restless Legs [] Sleep Apnea  [] Daytime Sleepiness  [] Denies all unless marked  Skin:[] Rash [] Skin Discoloration [x] Denies all unless marked   Neurological: []Visual Disturbance/Memory Loss [] Loss of Balance [] Slurred Speech/Weakness [] Seizures  [] Vertigo/Dizziness [x] Denies all unless marked       
appropriate  [x]Recent and remote memory appears unremarkable  [x]Speech normal without dysarthria or aphasia, comprehension and repetition intact.   COMMENTS:    Cranial Nerves [x]No VF deficit to confrontation,  no papilledema on fundoscopic exam.  [x]PERRLA, EOMI, no nystagmus, conjugate eye movements, no ptosis  [x]Face symmetric  [x]Facial sensation intact  [x]Tongue midline no atrophy or fasciculations present  [x]Palate midline, hearing to finger rub normal bilaterally  [x]Shoulder shrug and SCM testing normal bilaterally  COMMENTS:   Motor   [x]5/5 strength x 4 extremities  [x]Normal bulk and tone  [x]No tremor present  [x]No rigidity or bradykinesia noted  COMMENTS:   Sensory  [x]Sensation intact to light touch, pin prick, vibration, and proprioception BLE  [x]Sensation intact to light touch, pin prick, vibration, and proprioception BUE  COMMENTS:   Coordination [x]FTN normal bilaterally   [x]HTS normal bilaterally  [x]REID normal bilaterally.   COMMENTS:   Reflexes  [x]Symmetric and non-pathological  [x]Toes down going bilaterally  [x]No clonus present  COMMENTS:   Gait                  [x]Normal steady gait    []Ataxic    []Spastic     []Magnetic     []Shuffling  COMMENTS:       LABS RECORD AND IMAGING REVIEW (As below and per HPI)    No results found for: \"FOCFFGHJ57\"  No results found for: \"WBC\", \"HGB\", \"HCT\", \"MCV\", \"PLT\"  No results found for: \"NA\", \"K\", \"CL\", \"CO2\", \"BUN\", \"CREATININE\", \"GLUCOSE\", \"CALCIUM\", \"LABALBU\", \"BILITOT\", \"ALKPHOS\", \"AST\", \"ALT\", \"LABGLOM\", \"GFRAA\", \"AGRATIO\", \"GLOB\"  No results found for: \"CHOL\", \"TRIG\", \"HDL\"  No results found for: \"TSH\", \"T4FREE\"  No results found for: \"CRP\", \"SEDRATE\"     CTA head (2025)- normal     MRI brain (2024)-nothing acute.  A few tiny scattered foci of increased FLAIR signal within the upper white matter tracts is nonspecific, associated with chronic  versus migraine headaches    Reviewed referral records     Assessment & Plan  1. Sleep

## 2025-05-16 ENCOUNTER — TELEPHONE (OUTPATIENT)
Dept: FAMILY MEDICINE CLINIC | Facility: CLINIC | Age: 45
End: 2025-05-16

## 2025-05-16 NOTE — TELEPHONE ENCOUNTER
Spoke with patient about No Show appt this date 05- with Dr. Mays @ 8:00   explained to patient the office No Show policy and letter sent

## 2025-06-16 ENCOUNTER — TELEPHONE (OUTPATIENT)
Dept: NEUROLOGY | Facility: CLINIC | Age: 45
End: 2025-06-16
Payer: COMMERCIAL

## (undated) DEVICE — CUFF,BP,DISP,1 TUBE,ADULT,HP: Brand: MEDLINE

## (undated) DEVICE — TBG SMPL FLTR LINE NASL 02/C02 A/ BX/100

## (undated) DEVICE — ENDOGATOR AUXILIARY WATER JET CONNECTOR: Brand: ENDOGATOR

## (undated) DEVICE — Device: Brand: DEFENDO AIR/WATER/SUCTION AND BIOPSY VALVE

## (undated) DEVICE — SENSR O2 OXIMAX FNGR A/ 18IN NONSTR

## (undated) DEVICE — MSK O2 MD CONCENTR A/ LF 7FT 1P/U

## (undated) DEVICE — THE CHANNEL CLEANING BRUSH IS A NYLON FLEXI BRUSH ATTACHED TO A FLEXIBLE PLASTIC SHEATH DESIGNED TO SAFELY REMOVE DEBRIS FROM FLEXIBLE ENDOSCOPES.